# Patient Record
Sex: FEMALE | Race: WHITE | URBAN - METROPOLITAN AREA
[De-identification: names, ages, dates, MRNs, and addresses within clinical notes are randomized per-mention and may not be internally consistent; named-entity substitution may affect disease eponyms.]

---

## 2021-06-22 ENCOUNTER — OFFICE VISIT (OUTPATIENT)
Dept: INTERNAL MEDICINE | Age: 66
End: 2021-06-22
Payer: COMMERCIAL

## 2021-06-22 VITALS
HEIGHT: 61 IN | BODY MASS INDEX: 24.84 KG/M2 | HEART RATE: 82 BPM | WEIGHT: 131.6 LBS | OXYGEN SATURATION: 94 % | TEMPERATURE: 98 F | DIASTOLIC BLOOD PRESSURE: 62 MMHG | SYSTOLIC BLOOD PRESSURE: 110 MMHG | RESPIRATION RATE: 14 BRPM

## 2021-06-22 DIAGNOSIS — H61.21 IMPACTED CERUMEN OF RIGHT EAR: ICD-10-CM

## 2021-06-22 DIAGNOSIS — N63.32 MASS OF AXILLARY TAIL OF LEFT BREAST: Primary | ICD-10-CM

## 2021-06-22 PROCEDURE — 69209 REMOVE IMPACTED EAR WAX UNI: CPT | Performed by: NURSE PRACTITIONER

## 2021-06-22 PROCEDURE — G8420 CALC BMI NORM PARAMETERS: HCPCS | Performed by: NURSE PRACTITIONER

## 2021-06-22 PROCEDURE — 99203 OFFICE O/P NEW LOW 30 MIN: CPT | Performed by: NURSE PRACTITIONER

## 2021-06-22 PROCEDURE — 4004F PT TOBACCO SCREEN RCVD TLK: CPT | Performed by: NURSE PRACTITIONER

## 2021-06-22 PROCEDURE — 3017F COLORECTAL CA SCREEN DOC REV: CPT | Performed by: NURSE PRACTITIONER

## 2021-06-22 PROCEDURE — G8427 DOCREV CUR MEDS BY ELIG CLIN: HCPCS | Performed by: NURSE PRACTITIONER

## 2021-06-22 RX ORDER — TAMSULOSIN HYDROCHLORIDE 0.4 MG/1
CAPSULE ORAL
COMMUNITY

## 2021-06-22 RX ORDER — TIOTROPIUM BROMIDE 18 UG/1
CAPSULE ORAL; RESPIRATORY (INHALATION)
COMMUNITY

## 2021-06-22 RX ORDER — CETIRIZINE HYDROCHLORIDE 10 MG/1
TABLET ORAL
COMMUNITY

## 2021-06-22 RX ORDER — ATORVASTATIN CALCIUM 40 MG/1
TABLET, FILM COATED ORAL
COMMUNITY

## 2021-06-22 RX ORDER — NAPROXEN 500 MG/1
TABLET ORAL
COMMUNITY

## 2021-06-22 RX ORDER — POLYETHYLENE GLYCOL 3350 17 G/17G
POWDER, FOR SOLUTION ORAL
COMMUNITY

## 2021-06-22 RX ORDER — FLUTICASONE PROPIONATE 50 MCG
SPRAY, SUSPENSION (ML) NASAL
COMMUNITY

## 2021-06-22 RX ORDER — ESCITALOPRAM OXALATE 10 MG/1
TABLET ORAL
COMMUNITY

## 2021-06-22 RX ORDER — IBUPROFEN 400 MG/1
TABLET ORAL
COMMUNITY

## 2021-06-22 RX ORDER — ONDANSETRON 4 MG/1
TABLET, FILM COATED ORAL
COMMUNITY

## 2021-06-22 RX ORDER — PANTOPRAZOLE SODIUM 40 MG/1
TABLET, DELAYED RELEASE ORAL
COMMUNITY

## 2021-06-22 ASSESSMENT — PATIENT HEALTH QUESTIONNAIRE - PHQ9
SUM OF ALL RESPONSES TO PHQ QUESTIONS 1-9: 1
SUM OF ALL RESPONSES TO PHQ QUESTIONS 1-9: 1
SUM OF ALL RESPONSES TO PHQ9 QUESTIONS 1 & 2: 1
2. FEELING DOWN, DEPRESSED OR HOPELESS: 1
1. LITTLE INTEREST OR PLEASURE IN DOING THINGS: 0
SUM OF ALL RESPONSES TO PHQ QUESTIONS 1-9: 1

## 2021-06-22 NOTE — PATIENT INSTRUCTIONS
Patient Education        Breast Lumps: Care Instructions  Your Care Instructions  Breast lumps are common, especially in women between ages 27 and 48. Many women's breasts feel lumpy and tender before their menstrual period. Women also may have lumps when they are breastfeeding. Breast lumps may go away after menopause. All new breast lumps in women after menopause should be checked by a doctor. Although lumps may be normal for you, it is important to have your doctor check any lump or thickness that is not like the rest of your breast to make sure it is not cancer. A lump may be larger, harder, or different from the rest of your breast tissue. Follow-up care is a key part of your treatment and safety. Be sure to make and go to all appointments, and call your doctor if you are having problems. It's also a good idea to know your test results and keep a list of the medicines you take. How can you care for yourself at home? · Make an appointment to have a mammogram and other follow-up visits as recommended by your doctor. When should you call for help? Watch closely for changes in your health, and be sure to contact your doctor if:    · You do not get better as expected.     · Your breast has changed.     · You have pain in your breast.     · You have a discharge from your nipple.     · A breast lump changes or does not go away. Where can you learn more? Go to [email or web address]://IQ Elitecory.[employer]mode [email or web address] org and sign in to your [employer] account. Enter P849 in the [employer] box to learn more about \"Breast Lumps: Care Instructions. \"     If you do not have an account, please click on the \"Sign Up Now\" link. Current as of: February 11, 2021               Content Version: 12.9  © 4704-9944 Healthwise, [employer]. Care instructions adapted under license by [address]  If you have questions about a medical condition or this instruction, always ask your healthcare professional. Abraham Martines from your ear.     · Your ears are ringing or feel full.     · You have a loss of hearing. Watch closely for changes in your health, and be sure to contact your doctor if:    · You have pain or reduced hearing after 1 week of home treatment.     · You have any new symptoms, such as nausea or balance problems. Where can you learn more? Go to https://uMix.TVpepiceweb.HealthCare.com. org and sign in to your [facility] account. Enter K346 in the [employer] box to learn more about \"Earwax Blockage: Care Instructions. \"     If you do not have an account, please click on the \"Sign Up Now\" link. Current as of: October 19, 2020               Content Version: 12.9  © 2006-2021 Healthwise, Incorporated. Care instructions adapted under license by [facility] [facility] If you have questions about a medical condition or this instruction, always ask your healthcare professional. [address] any warranty or liability for your use of this information.

## 2021-06-22 NOTE — PROGRESS NOTES
Subjective:      Patient ID: Ministerio Gutiérrez is a 63 y.o. female who presents today for:  Chief Complaint   Patient presents with    Breast Mass     left side, noticed last night    Cerumen Impaction     right       Other  This is a new (left breast mass) problem. The current episode started yesterday. The problem occurs constantly. The problem has been unchanged. Pertinent negatives include no chest pain, chills, congestion, coughing, fatigue, fever, headaches, rash or sore throat. Nothing aggravates the symptoms. She has tried nothing for the symptoms. Otalgia   There is pain in the right ear. This is a new problem. The current episode started today. The problem occurs constantly. The problem has been unchanged. There has been no fever. The pain is at a severity of 2/10. The pain is mild. Associated symptoms include hearing loss (muffled). Pertinent negatives include no coughing, ear discharge, headaches, rash, rhinorrhea or sore throat. She has tried nothing for the symptoms. There is no history of a chronic ear infection, hearing loss or a tympanostomy tube. Past Medical History:   Diagnosis Date    Anxiety     Arthritis     COPD [facility]chronic obstructive pulmonary disease) ([facility] [address]     Hx of blood clots      Past Surgical History:   Procedure Laterality Date    BREAST SURGERY      lumpectomy    FINGER TRIGGER RELEASE      HYSTERECTOMY      KNEE CARTILAGE SURGERY Bilateral     TOENAIL EXCISION Bilateral     all     History reviewed. No pertinent family history.   Allergies   Allergen Reactions    Morphine Hives    Baclofen Other (See Comments)    Fluoxetine Other (See Comments)    Gabapentin Other (See Comments)    Levofloxacin Other (See Comments)    Loratadine     Moxifloxacin Other (See Comments)    Nitrofurantoin Macrocrystal Other (See Comments)    Olanzapine Other (See Comments)    Pcn [Penicillins] Swelling    Tramadol Nausea Only         Review of Systems   Constitutional: Negative for chills, fatigue and fever. HENT: Positive for ear pain and hearing loss (muffled). Negative for congestion, ear discharge, postnasal drip, rhinorrhea, sinus pressure, sinus pain and sore throat. Respiratory: Negative for cough, shortness of breath and wheezing. Cardiovascular: Negative for chest pain. Skin: Negative for color change and rash. Neurological: Negative for headaches. Objective:   /62   Pulse 82   Temp 98 °F (36.7 °C) (Infrared)   Resp 14   Ht 5' 1\" (1.549 m)   Wt 131 lb 9.6 oz (59.7 kg)   SpO2 94%   BMI 24.87 kg/m²     Physical Exam  Vitals reviewed. Constitutional:       General: She is not in acute distress. Appearance: She is well-developed. She is not ill-appearing. HENT:      Head: Normocephalic. Right Ear: Tympanic membrane, ear canal and external ear normal. There is impacted cerumen. Left Ear: Tympanic membrane, ear canal and external ear normal.      Ears:      Comments: Right Ear: right TM could not see  Left Ear: normal appearance  After removal: normal bilaterally     Nose: Nose normal.      Mouth/Throat:      Lips: Pink. Mouth: Mucous membranes are moist.      Pharynx: Oropharynx is clear. Cardiovascular:      Rate and Rhythm: Normal rate and regular rhythm. Heart sounds: Normal heart sounds. Pulmonary:      Effort: Pulmonary effort is normal. No respiratory distress. Breath sounds: Normal breath sounds. No decreased breath sounds or wheezing. Chest:      Breasts:         Right: Normal.         Left: Mass (upper outer, near start of axillary tail, < 2 cm) present. No swelling, bleeding, inverted nipple, nipple discharge, skin change or tenderness. Musculoskeletal:         General: Normal range of motion. Cervical back: Normal range of motion. Lymphadenopathy:      Cervical: No cervical adenopathy. Skin:     General: Skin is warm and dry. Capillary Refill: Capillary refill takes less than 2 seconds. Neurological:      Mental Status: She is alert and oriented to person, place, and time. Assessment:       Diagnosis Orders   1. Mass of axillary tail of left breast  [facility]NO[facility]IC W OR WO CAD BILATERAL    US BREAST COMPLETE LEFT   2. Impacted cerumen of right ear  SC REMOVAL IMPACTED CERUMEN IRRIGATION/LVG UNILAT         Plan:      Orders Placed This Encounter   Procedures    [facility]ICA DIGITAL DIAGNOSTIC W OR WO CAD BILATERAL     Standing Status:   Future     Standing Expiration Date:   8/22/2022     Order Specific Question:   Reason for exam:     Answer:   mass of left axilla/breast    US BREAST COMPLETE LEFT     Standing Status:   Future     Standing Expiration Date:   6/22/2022     Order Specific Question:   Reason for exam:     Answer:   mass of left axilla/breast    SC REMOVAL IMPACTED CERUMEN IRRIGATION/LVG UNILAT     Cerumen removal via irrigation. Patient tolerated well. No signs of infection following removal.  No additional treatments at this time. Left breast mass. No signs of infection. Diagnostic testing ordered. Offered to facilitate scheduling. Patient declined, stating she will be moving back to the [place] area next week and is undecided where she would like to receive care going forward. Patient to call the office if she needs assistance with scheduling. Advised close monitoring and prompt follow up for new or worsening symptoms. Patient verbalizes understanding. I have reviewed and updated the electronic medical record. Return if symptoms worsen or fail to improve, for follow up with PCP.     JUSTINE Varner NP

## 2021-06-26 PROBLEM — E11.9 TYPE 2 DIABETES MELLITUS WITHOUT COMPLICATIONS (HCC): Status: ACTIVE | Noted: 2020-07-08

## 2021-06-26 PROBLEM — I73.9 PERIPHERAL VASCULAR DISEASE, UNSPECIFIED (HCC): Status: ACTIVE | Noted: 2020-06-30

## 2021-06-26 PROBLEM — Z87.891 PERSONAL HISTORY OF NICOTINE DEPENDENCE: Status: ACTIVE | Noted: 2020-07-07

## 2021-06-26 PROBLEM — Z79.02 LONG TERM (CURRENT) USE OF ANTITHROMBOTICS/ANTIPLATELETS: Status: ACTIVE | Noted: 2020-09-18

## 2021-06-26 PROBLEM — Z86.73 PRSNL HX OF TIA (TIA), AND CEREB INFRC W/O RESID DEFICITS: Status: ACTIVE | Noted: 2020-07-07

## 2021-06-26 ASSESSMENT — ENCOUNTER SYMPTOMS
WHEEZING: 0
SINUS PRESSURE: 0
SORE THROAT: 0
COLOR CHANGE: 0
SHORTNESS OF BREATH: 0
COUGH: 0
SINUS PAIN: 0
RHINORRHEA: 0

## 2022-02-01 LAB
BASE EXCESS: 4 MMOL/L (ref -2–3)
CARBOXYHEMOGLOBIN: 3.8 % (ref 0–1.5)
DELIVERY SYSTEMS: ABNORMAL
FIO2: 0 %
HCO3: 28 MMOL/L (ref 21–28)
METHEMOGLOBIN: 0.8 % (ref 0–1.5)
OXYHEMOGLOBIN: 93.2 % (ref 94–97)
PCO2: 38 MMHG (ref 35–45)
PH: 7.47 PH (ref 7.35–7.45)
PO2: 81 MMHG (ref 83–108)
TOTAL HGB: 13.9 G/DL (ref 12–16.3)

## 2022-12-06 ENCOUNTER — APPOINTMENT (OUTPATIENT)
Dept: GENERAL RADIOLOGY | Age: 67
End: 2022-12-06
Payer: MEDICAID

## 2022-12-06 ENCOUNTER — HOSPITAL ENCOUNTER (EMERGENCY)
Age: 67
Discharge: HOME | End: 2022-12-06
Attending: EMERGENCY MEDICINE
Payer: MEDICAID

## 2022-12-06 VITALS
BODY MASS INDEX: 25.11 KG/M2 | RESPIRATION RATE: 26 BRPM | DIASTOLIC BLOOD PRESSURE: 81 MMHG | HEART RATE: 78 BPM | OXYGEN SATURATION: 93 % | TEMPERATURE: 98.4 F | HEIGHT: 67 IN | SYSTOLIC BLOOD PRESSURE: 127 MMHG | WEIGHT: 160 LBS

## 2022-12-06 DIAGNOSIS — T20.10XA SUPERFICIAL BURN OF FACE, INITIAL ENCOUNTER: Primary | ICD-10-CM

## 2022-12-06 LAB
ABSOLUTE EOS #: 0.14 K/UL (ref 0–0.44)
ABSOLUTE IMMATURE GRANULOCYTE: 0.03 K/UL (ref 0–0.3)
ABSOLUTE LYMPH #: 2.16 K/UL (ref 1.1–3.7)
ABSOLUTE MONO #: 0.88 K/UL (ref 0.1–1.2)
ALBUMIN SERPL-MCNC: 4.2 G/DL (ref 3.5–5.2)
ALBUMIN/GLOBULIN RATIO: 1.1 (ref 1–2.5)
ALP BLD-CCNC: 92 U/L (ref 35–104)
ALT SERPL-CCNC: 15 U/L (ref 5–33)
ANION GAP SERPL CALCULATED.3IONS-SCNC: 15 MMOL/L (ref 9–17)
AST SERPL-CCNC: 18 U/L
BASOPHILS # BLD: 1 % (ref 0–2)
BASOPHILS ABSOLUTE: 0.06 K/UL (ref 0–0.2)
BILIRUB SERPL-MCNC: 0.2 MG/DL (ref 0.3–1.2)
BUN BLDV-MCNC: 15 MG/DL (ref 8–23)
CALCIUM SERPL-MCNC: 8.5 MG/DL (ref 8.6–10.4)
CHLORIDE BLD-SCNC: 100 MMOL/L (ref 98–107)
CO2: 21 MMOL/L (ref 20–31)
CREAT SERPL-MCNC: 0.69 MG/DL (ref 0.5–0.9)
EOSINOPHILS RELATIVE PERCENT: 2 % (ref 1–4)
GFR SERPL CREATININE-BSD FRML MDRD: >60 ML/MIN/1.73M2
GLUCOSE BLD-MCNC: 123 MG/DL (ref 70–99)
HCT VFR BLD CALC: 41.1 % (ref 36.3–47.1)
HEMOGLOBIN: 13.1 G/DL (ref 11.9–15.1)
IMMATURE GRANULOCYTES: 0 %
LYMPHOCYTES # BLD: 23 % (ref 24–43)
MCH RBC QN AUTO: 28.5 PG (ref 25.2–33.5)
MCHC RBC AUTO-ENTMCNC: 31.9 G/DL (ref 28.4–34.8)
MCV RBC AUTO: 89.5 FL (ref 82.6–102.9)
MONOCYTES # BLD: 9 % (ref 3–12)
NRBC AUTOMATED: 0 PER 100 WBC
PDW BLD-RTO: 13.4 % (ref 11.8–14.4)
PLATELET # BLD: 271 K/UL (ref 138–453)
PMV BLD AUTO: 10.5 FL (ref 8.1–13.5)
POTASSIUM SERPL-SCNC: 3.5 MMOL/L (ref 3.7–5.3)
RBC # BLD: 4.59 M/UL (ref 3.95–5.11)
SEG NEUTROPHILS: 65 % (ref 36–65)
SEGMENTED NEUTROPHILS ABSOLUTE COUNT: 6.29 K/UL (ref 1.5–8.1)
SODIUM BLD-SCNC: 136 MMOL/L (ref 135–144)
TOTAL PROTEIN: 8.2 G/DL (ref 6.4–8.3)
WBC # BLD: 9.6 K/UL (ref 3.5–11.3)

## 2022-12-06 PROCEDURE — 85025 COMPLETE CBC W/AUTO DIFF WBC: CPT

## 2022-12-06 PROCEDURE — 6370000000 HC RX 637 (ALT 250 FOR IP): Performed by: STUDENT IN AN ORGANIZED HEALTH CARE EDUCATION/TRAINING PROGRAM

## 2022-12-06 PROCEDURE — 6360000002 HC RX W HCPCS: Performed by: STUDENT IN AN ORGANIZED HEALTH CARE EDUCATION/TRAINING PROGRAM

## 2022-12-06 PROCEDURE — 93005 ELECTROCARDIOGRAM TRACING: CPT | Performed by: STUDENT IN AN ORGANIZED HEALTH CARE EDUCATION/TRAINING PROGRAM

## 2022-12-06 PROCEDURE — 71046 X-RAY EXAM CHEST 2 VIEWS: CPT

## 2022-12-06 PROCEDURE — 96374 THER/PROPH/DIAG INJ IV PUSH: CPT

## 2022-12-06 PROCEDURE — 99285 EMERGENCY DEPT VISIT HI MDM: CPT

## 2022-12-06 PROCEDURE — 80053 COMPREHEN METABOLIC PANEL: CPT

## 2022-12-06 RX ORDER — OXYCODONE HYDROCHLORIDE 5 MG/1
5 TABLET ORAL EVERY 6 HOURS PRN
Qty: 12 TABLET | Refills: 0 | Status: SHIPPED | OUTPATIENT
Start: 2022-12-06 | End: 2022-12-09

## 2022-12-06 RX ORDER — ACETAMINOPHEN 500 MG
1000 TABLET ORAL ONCE
Status: COMPLETED | OUTPATIENT
Start: 2022-12-06 | End: 2022-12-06

## 2022-12-06 RX ORDER — OXYCODONE HYDROCHLORIDE 5 MG/1
10 TABLET ORAL ONCE
Status: COMPLETED | OUTPATIENT
Start: 2022-12-06 | End: 2022-12-06

## 2022-12-06 RX ORDER — BACITRACIN ZINC AND POLYMYXIN B SULFATE 500; 10000 [USP'U]/G; [USP'U]/G
0.5 OINTMENT OPHTHALMIC 2 TIMES DAILY
Qty: 7 G | Refills: 0 | Status: SHIPPED | OUTPATIENT
Start: 2022-12-06 | End: 2022-12-08

## 2022-12-06 RX ORDER — FENTANYL CITRATE 50 UG/ML
50 INJECTION, SOLUTION INTRAMUSCULAR; INTRAVENOUS ONCE
Status: COMPLETED | OUTPATIENT
Start: 2022-12-06 | End: 2022-12-06

## 2022-12-06 RX ADMIN — OXYCODONE 10 MG: 5 TABLET ORAL at 21:02

## 2022-12-06 RX ADMIN — ACETAMINOPHEN 1000 MG: 500 TABLET ORAL at 19:10

## 2022-12-06 RX ADMIN — FENTANYL CITRATE 50 MCG: 50 INJECTION, SOLUTION INTRAMUSCULAR; INTRAVENOUS at 19:07

## 2022-12-06 ASSESSMENT — PAIN SCALES - GENERAL
PAINLEVEL_OUTOF10: 10
PAINLEVEL_OUTOF10: 10

## 2022-12-06 ASSESSMENT — PAIN DESCRIPTION - LOCATION
LOCATION: FACE;NOSE
LOCATION: FACE;NOSE

## 2022-12-06 ASSESSMENT — PAIN - FUNCTIONAL ASSESSMENT: PAIN_FUNCTIONAL_ASSESSMENT: 0-10

## 2022-12-06 ASSESSMENT — ENCOUNTER SYMPTOMS: TACHYPNEA: 1
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
## 2022-12-07 LAB
EKG ATRIAL RATE: 80 BPM
EKG P AXIS: 72 DEGREES
EKG P-R INTERVAL: 144 MS
EKG Q-T INTERVAL: 404 MS
EKG QRS DURATION: 82 MS
EKG QTC CALCULATION (BAZETT): 465 MS
EKG R AXIS: 58 DEGREES
EKG T AXIS: 59 DEGREES
EKG VENTRICULAR RATE: 80 BPM

## 2022-12-07 PROCEDURE — 93010 ELECTROCARDIOGRAM REPORT: CPT | Performed by: INTERNAL MEDICINE

## 2022-12-07 ASSESSMENT — ENCOUNTER SYMPTOMS
NAUSEA: 0
ABDOMINAL PAIN: 0
BACK PAIN: 0
CHEST TIGHTNESS: 0
SHORTNESS OF BREATH: 0
COUGH: 0
TROUBLE SWALLOWING: 0
DIARRHEA: 0
VOMITING: 0
COLOR CHANGE: 1

## 2022-12-07 NOTE — PROGRESS NOTES
[facility]     Emergency/Trauma Note    PATIENT NAME: Murphy Jeans    Shift date: 12/6/22  Shift day: Tuesday   Shift # 2    Room # 28/28   Name: Murphy Jeans            Age: 74 y.o. Gender: female          [employer]ism: Episcopal   Place of Religious:     Trauma/Incident type: Adult Trauma Consult  Admit Date & Time: 12/6/2022  6:16 PM  TRAUMA NAME: N/A    ADVANCE DIRECTIVES IN CHART? No    NAME OF DECISION MAKER: N/A    RELATIONSHIP OF DECISION MAKER TO PATIENT: N/A    PATIENT/EVENT DESCRIPTION:  Murphy Jeans is a 74 y.o. female who arrived at [facility].  received perfect serve for consult. Pt to be admitted to 28/28. SPIRITUAL ASSESSMENT-INTERVENTION-OUTCOME:  Writer introduced self . Patient appeared receptive to  visit and engaged in conversation about her health and the events of the day.  validated patients feelings and emotions. Patient continues coping with her health and the days events. PATIENT BELONGINGS:   writing did not handle patient belongings    ANY BELONGINGS OF SIGNIFICANT VALUE NOTED:  N/A    REGISTRATION STAFF NOTIFIED? Yes      WHAT IS YOUR SPIRITUAL CARE PLAN FOR THIS PATIENT?:   Chaplains will remain available to offer spiritual and emotional support as needed.     Electronically signed by Dennie Hof, on 12/6/2022 at 8:36 PM.  Jose Guadarrama  [phone]
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
## 2022-12-08 RX ORDER — GINSENG 100 MG
CAPSULE ORAL
Qty: 28 G | Refills: 0 | Status: SHIPPED | OUTPATIENT
Start: 2022-12-08 | End: 2022-12-18

## 2023-03-16 ENCOUNTER — APPOINTMENT (OUTPATIENT)
Dept: CT IMAGING | Age: 68
End: 2023-03-16
Payer: MEDICAID

## 2023-03-16 ENCOUNTER — HOSPITAL ENCOUNTER (INPATIENT)
Age: 68
LOS: 1 days | Discharge: NURSING, REHAB OR LONG-TERM CARE | End: 2023-03-17
Attending: EMERGENCY MEDICINE | Admitting: EMERGENCY MEDICINE
Payer: MEDICAID

## 2023-03-16 DIAGNOSIS — R10.31 RIGHT LOWER QUADRANT ABDOMINAL PAIN: Primary | ICD-10-CM

## 2023-03-16 DIAGNOSIS — R10.9 FLANK PAIN: ICD-10-CM

## 2023-03-16 LAB
ABSOLUTE EOS #: 0.08 K/UL (ref 0–0.44)
ABSOLUTE IMMATURE GRANULOCYTE: <0.03 K/UL (ref 0–0.3)
ABSOLUTE LYMPH #: 1.31 K/UL (ref 1.1–3.7)
ABSOLUTE MONO #: 0.84 K/UL (ref 0.1–1.2)
ANION GAP SERPL CALCULATED.3IONS-SCNC: 14 MMOL/L (ref 9–17)
BASOPHILS # BLD: 1 % (ref 0–2)
BASOPHILS ABSOLUTE: 0.06 K/UL (ref 0–0.2)
BILIRUBIN URINE: NEGATIVE
BUN SERPL-MCNC: 12 MG/DL (ref 8–23)
CALCIUM SERPL-MCNC: 8.5 MG/DL (ref 8.6–10.4)
CASTS UA: NORMAL /LPF (ref 0–8)
CHLORIDE SERPL-SCNC: 98 MMOL/L (ref 98–107)
CO2 SERPL-SCNC: 25 MMOL/L (ref 20–31)
COLOR: YELLOW
CREAT SERPL-MCNC: 0.62 MG/DL (ref 0.5–0.9)
EOSINOPHILS RELATIVE PERCENT: 1 % (ref 1–4)
EPITHELIAL CELLS UA: NORMAL /HPF (ref 0–5)
GFR SERPL CREATININE-BSD FRML MDRD: >60 ML/MIN/1.73M2
GLUCOSE SERPL-MCNC: 89 MG/DL (ref 70–99)
GLUCOSE UR STRIP.AUTO-MCNC: NEGATIVE MG/DL
HCT VFR BLD AUTO: 41.2 % (ref 36.3–47.1)
HGB BLD-MCNC: 13.1 G/DL (ref 11.9–15.1)
IMMATURE GRANULOCYTES: 0 %
KETONES UR STRIP.AUTO-MCNC: ABNORMAL MG/DL
LEUKOCYTE ESTERASE UR QL STRIP.AUTO: ABNORMAL
LYMPHOCYTES # BLD: 18 % (ref 24–43)
MCH RBC QN AUTO: 27.8 PG (ref 25.2–33.5)
MCHC RBC AUTO-ENTMCNC: 31.8 G/DL (ref 28.4–34.8)
MCV RBC AUTO: 87.3 FL (ref 82.6–102.9)
MONOCYTES # BLD: 12 % (ref 3–12)
NITRITE UR QL STRIP.AUTO: NEGATIVE
NRBC AUTOMATED: 0 PER 100 WBC
PDW BLD-RTO: 14 % (ref 11.8–14.4)
PLATELET # BLD AUTO: 269 K/UL (ref 138–453)
PMV BLD AUTO: 10.3 FL (ref 8.1–13.5)
POTASSIUM SERPL-SCNC: 3 MMOL/L (ref 3.7–5.3)
PROT UR STRIP.AUTO-MCNC: 6 MG/DL (ref 5–8)
PROT UR STRIP.AUTO-MCNC: ABNORMAL MG/DL
RBC # BLD: 4.72 M/UL (ref 3.95–5.11)
RBC CLUMPS #/AREA URNS AUTO: NORMAL /HPF (ref 0–4)
SEG NEUTROPHILS: 68 % (ref 36–65)
SEGMENTED NEUTROPHILS ABSOLUTE COUNT: 4.87 K/UL (ref 1.5–8.1)
SODIUM SERPL-SCNC: 137 MMOL/L (ref 135–144)
SPECIFIC GRAVITY UA: 1.02 (ref 1–1.03)
TURBIDITY: CLEAR
URINE HGB: NEGATIVE
UROBILINOGEN, URINE: NORMAL
WBC # BLD AUTO: 7.2 K/UL (ref 3.5–11.3)
WBC UA: NORMAL /HPF (ref 0–5)

## 2023-03-16 PROCEDURE — 85025 COMPLETE CBC W/AUTO DIFF WBC: CPT

## 2023-03-16 PROCEDURE — 6360000002 HC RX W HCPCS: Performed by: PEDIATRICS

## 2023-03-16 PROCEDURE — 87086 URINE CULTURE/COLONY COUNT: CPT

## 2023-03-16 PROCEDURE — 74177 CT ABD & PELVIS W/CONTRAST: CPT

## 2023-03-16 PROCEDURE — 99285 EMERGENCY DEPT VISIT HI MDM: CPT

## 2023-03-16 PROCEDURE — 81001 URINALYSIS AUTO W/SCOPE: CPT

## 2023-03-16 PROCEDURE — 2580000003 HC RX 258: Performed by: STUDENT IN AN ORGANIZED HEALTH CARE EDUCATION/TRAINING PROGRAM

## 2023-03-16 PROCEDURE — 6370000000 HC RX 637 (ALT 250 FOR IP): Performed by: STUDENT IN AN ORGANIZED HEALTH CARE EDUCATION/TRAINING PROGRAM

## 2023-03-16 PROCEDURE — 74176 CT ABD & PELVIS W/O CONTRAST: CPT

## 2023-03-16 PROCEDURE — 80048 BASIC METABOLIC PNL TOTAL CA: CPT

## 2023-03-16 PROCEDURE — 96374 THER/PROPH/DIAG INJ IV PUSH: CPT

## 2023-03-16 PROCEDURE — 1200000000 HC SEMI PRIVATE

## 2023-03-16 PROCEDURE — 2580000003 HC RX 258: Performed by: PEDIATRICS

## 2023-03-16 PROCEDURE — 6360000002 HC RX W HCPCS: Performed by: STUDENT IN AN ORGANIZED HEALTH CARE EDUCATION/TRAINING PROGRAM

## 2023-03-16 PROCEDURE — 6360000004 HC RX CONTRAST MEDICATION: Performed by: STUDENT IN AN ORGANIZED HEALTH CARE EDUCATION/TRAINING PROGRAM

## 2023-03-16 PROCEDURE — 96376 TX/PRO/DX INJ SAME DRUG ADON: CPT

## 2023-03-16 PROCEDURE — 96375 TX/PRO/DX INJ NEW DRUG ADDON: CPT

## 2023-03-16 RX ORDER — CEPHALEXIN 500 MG/1
500 CAPSULE ORAL ONCE
Status: COMPLETED | OUTPATIENT
Start: 2023-03-16 | End: 2023-03-16

## 2023-03-16 RX ORDER — POTASSIUM CHLORIDE 20 MEQ/1
40 TABLET, EXTENDED RELEASE ORAL PRN
Status: DISCONTINUED | OUTPATIENT
Start: 2023-03-16 | End: 2023-03-17 | Stop reason: HOSPADM

## 2023-03-16 RX ORDER — POLYETHYLENE GLYCOL 3350 17 G/17G
17 POWDER, FOR SOLUTION ORAL DAILY PRN
Status: DISCONTINUED | OUTPATIENT
Start: 2023-03-16 | End: 2023-03-17 | Stop reason: HOSPADM

## 2023-03-16 RX ORDER — ACETAMINOPHEN 650 MG/1
650 SUPPOSITORY RECTAL EVERY 6 HOURS PRN
Status: DISCONTINUED | OUTPATIENT
Start: 2023-03-16 | End: 2023-03-17 | Stop reason: HOSPADM

## 2023-03-16 RX ORDER — SODIUM CHLORIDE 0.9 % (FLUSH) 0.9 %
5-40 SYRINGE (ML) INJECTION EVERY 12 HOURS SCHEDULED
Status: DISCONTINUED | OUTPATIENT
Start: 2023-03-16 | End: 2023-03-17 | Stop reason: HOSPADM

## 2023-03-16 RX ORDER — KETOROLAC TROMETHAMINE 15 MG/ML
15 INJECTION, SOLUTION INTRAMUSCULAR; INTRAVENOUS ONCE
Status: COMPLETED | OUTPATIENT
Start: 2023-03-16 | End: 2023-03-16

## 2023-03-16 RX ORDER — 0.9 % SODIUM CHLORIDE 0.9 %
1000 INTRAVENOUS SOLUTION INTRAVENOUS ONCE
Status: COMPLETED | OUTPATIENT
Start: 2023-03-16 | End: 2023-03-16

## 2023-03-16 RX ORDER — POTASSIUM CHLORIDE 7.45 MG/ML
10 INJECTION INTRAVENOUS PRN
Status: DISCONTINUED | OUTPATIENT
Start: 2023-03-16 | End: 2023-03-17 | Stop reason: HOSPADM

## 2023-03-16 RX ORDER — MORPHINE SULFATE 4 MG/ML
4 INJECTION, SOLUTION INTRAMUSCULAR; INTRAVENOUS ONCE
Status: COMPLETED | OUTPATIENT
Start: 2023-03-16 | End: 2023-03-16

## 2023-03-16 RX ORDER — ONDANSETRON 2 MG/ML
4 INJECTION INTRAMUSCULAR; INTRAVENOUS EVERY 4 HOURS PRN
Status: DISCONTINUED | OUTPATIENT
Start: 2023-03-16 | End: 2023-03-17 | Stop reason: HOSPADM

## 2023-03-16 RX ORDER — ACETAMINOPHEN 325 MG/1
650 TABLET ORAL EVERY 6 HOURS PRN
Status: DISCONTINUED | OUTPATIENT
Start: 2023-03-16 | End: 2023-03-17 | Stop reason: HOSPADM

## 2023-03-16 RX ORDER — ENOXAPARIN SODIUM 100 MG/ML
40 INJECTION SUBCUTANEOUS DAILY
Status: DISCONTINUED | OUTPATIENT
Start: 2023-03-17 | End: 2023-03-17 | Stop reason: HOSPADM

## 2023-03-16 RX ORDER — SODIUM CHLORIDE 0.9 % (FLUSH) 0.9 %
5-40 SYRINGE (ML) INJECTION PRN
Status: DISCONTINUED | OUTPATIENT
Start: 2023-03-16 | End: 2023-03-17 | Stop reason: HOSPADM

## 2023-03-16 RX ORDER — ONDANSETRON 2 MG/ML
4 INJECTION INTRAMUSCULAR; INTRAVENOUS ONCE
Status: COMPLETED | OUTPATIENT
Start: 2023-03-16 | End: 2023-03-16

## 2023-03-16 RX ORDER — SODIUM CHLORIDE 9 MG/ML
25 INJECTION, SOLUTION INTRAVENOUS PRN
Status: DISCONTINUED | OUTPATIENT
Start: 2023-03-16 | End: 2023-03-17 | Stop reason: HOSPADM

## 2023-03-16 RX ORDER — SODIUM CHLORIDE 9 MG/ML
INJECTION, SOLUTION INTRAVENOUS CONTINUOUS
Status: DISCONTINUED | OUTPATIENT
Start: 2023-03-16 | End: 2023-03-17 | Stop reason: HOSPADM

## 2023-03-16 RX ADMIN — SODIUM CHLORIDE 1000 ML: 9 INJECTION, SOLUTION INTRAVENOUS at 16:21

## 2023-03-16 RX ADMIN — KETOROLAC TROMETHAMINE 15 MG: 15 INJECTION, SOLUTION INTRAMUSCULAR; INTRAVENOUS at 16:20

## 2023-03-16 RX ADMIN — SODIUM CHLORIDE, PRESERVATIVE FREE 10 ML: 5 INJECTION INTRAVENOUS at 22:45

## 2023-03-16 RX ADMIN — ONDANSETRON 4 MG: 2 INJECTION INTRAMUSCULAR; INTRAVENOUS at 19:43

## 2023-03-16 RX ADMIN — CEPHALEXIN 500 MG: 500 CAPSULE ORAL at 18:46

## 2023-03-16 RX ADMIN — SODIUM CHLORIDE: 9 INJECTION, SOLUTION INTRAVENOUS at 23:05

## 2023-03-16 RX ADMIN — MORPHINE SULFATE 4 MG: 4 INJECTION INTRAVENOUS at 18:12

## 2023-03-16 RX ADMIN — IOPAMIDOL 75 ML: 755 INJECTION, SOLUTION INTRAVENOUS at 19:23

## 2023-03-16 RX ADMIN — ONDANSETRON 4 MG: 2 INJECTION INTRAMUSCULAR; INTRAVENOUS at 23:05

## 2023-03-16 RX ADMIN — POTASSIUM BICARBONATE 40 MEQ: 782 TABLET, EFFERVESCENT ORAL at 18:46

## 2023-03-16 RX ADMIN — MORPHINE SULFATE 4 MG: 4 INJECTION INTRAVENOUS at 21:43

## 2023-03-16 ASSESSMENT — PAIN DESCRIPTION - ORIENTATION: ORIENTATION: RIGHT

## 2023-03-16 ASSESSMENT — ENCOUNTER SYMPTOMS
NAUSEA: 1
ABDOMINAL PAIN: 1

## 2023-03-16 ASSESSMENT — PAIN DESCRIPTION - DESCRIPTORS: DESCRIPTORS: DISCOMFORT;ACHING

## 2023-03-16 ASSESSMENT — PAIN SCALES - GENERAL: PAINLEVEL_OUTOF10: 7

## 2023-03-16 ASSESSMENT — PAIN DESCRIPTION - LOCATION: LOCATION: FLANK

## 2023-03-16 NOTE — ED NOTES
Pt had episode of vomiting witnessed by other staff. Resident aware, ordered medication given.       Kings Chopra RN  03/16/23 [id number]

## 2023-03-16 NOTE — ED PROVIDER NOTES
[facility]     Emergency Department     Faculty Attestation    I performed a history and physical examination of the patient and discussed management with the resident. I reviewed the resident´s note and agree with the documented findings and plan of care. Any areas of disagreement are noted on the chart. I was personally present for the key portions of any procedures. I have documented in the chart those procedures where I was not present during the key portions. I have reviewed the emergency nurses triage note. I agree with the chief complaint, past medical history, past surgical history, allergies, medications, social and family history as documented unless otherwise noted below. For Physician Assistant/ Nurse Practitioner cases/documentation I have personally evaluated this patient and have completed at least one if not all key elements of the E/M (history, physical exam, and MDM). Additional findings are as noted. History of lung cancer, right flank pain for several days. Abdomen is slightly distended, no pulsatile mass or bruits.      Nelida Amezquita MD  03/16/23 1417

## 2023-03-16 NOTE — ED NOTES
Pt resting on cot, no acute distress noted, RR even and nL. Pt denies complaints at this time.         Garrick Cook RN  03/16/23 3822

## 2023-03-16 NOTE — ED PROVIDER NOTES
[facility] ED  Emergency Department Encounter  Emergency Medicine Resident     Pt [facility]  MRN: [id number]  [place] 1955  Date of evaluation: 3/16/23  PCP:  Evelin Torres MD      [address] [place] [address]       Chief Complaint   Patient presents with    Flank Pain     Right x 3 weeks       HISTORY OF PRESENT ILLNESS  (Location/Symptom, Timing/Onset, Context/Setting, Quality, Duration, Modifying Factors, Severity.)      Eusebia Berrios is a 74 y.o. female who presents with right-sided abdominal pain and right-sided flank pain. It has been progressively worsening over 3 weeks history of lung cancer, history of COPD, history of type 2 diabetes and hypertension. She wears 2 L nasal cannula at home not requiring anything more today. She has no shortness of breath or chest pain on review of systems. She denies vomiting but reports nausea. She is actually requesting for food in the room at this time. She rates her pain as 7 out of 10 starts in her lower abdomen and radiates to her right flank. PAST MEDICAL / SURGICAL / SOCIAL / FAMILY HISTORY      has a past medical history of Anxiety, Arthritis, COPD (chronic obstructive pulmonary disease) ([facility] [address] and Hx of blood clots. has a past surgical history that includes Breast surgery; Finger trigger release; Knee cartilage surgery (Bilateral); Hysterectomy; and toenail excision (Bilateral).       Social History     Socioeconomic History    Marital status:      Spouse name: Not on file    Number of children: Not on file    Years of education: Not on file    Highest education level: Not on file   Occupational History    Not on file   Tobacco Use    Smoking status: Every Day     Packs/day: 1.00     Years: 50.00     Pack years: 50.00     Types: Cigarettes     Start date: 28    Smokeless tobacco: Never   Substance and Sexual Activity    Alcohol use: Not on file    Drug use: Not on file    Sexual activity: Not on file   Other Topics Concern    Not on file   Social History Narrative    Not on file     Social Determinants of Health     Financial Resource Strain: Not on file   Food Insecurity: Not on file   Transportation Needs: Not on file   Physical Activity: Not on file   Stress: Not on file   Social Connections: Not on file   Intimate Partner Violence: Not on file   Housing Stability: Not on file       No family history on file. Allergies:  Morphine, Baclofen, Fluoxetine, Gabapentin, Levofloxacin, Loratadine, Moxifloxacin, Nitrofurantoin macrocrystal, Olanzapine, Pcn [penicillins], and Tramadol    Home Medications:  Prior to Admission medications    Medication Sig Start Date End Date Taking?  Authorizing Provider   tiotropium (SPIRIVA HANDIHALER) 18 MCG inhalation capsule Spiriva with HandiHaler 18 mcg and inhalation capsules    Historical Provider, MD   tamsulosin (FLOMAX) 0.4 MG capsule tamsulosin 0.4 mg capsule   TAKE ONE CAPSULE BY MOUTH EVERY DAY    Historical Provider, MD   polyethylene glycol (GLYCOLAX) 17 GM/SCOOP powder polyethylene glycol 3350 17 gram oral powder packet    Historical Provider, MD   pantoprazole (PROTONIX) 40 MG tablet pantoprazole 40 mg tablet,delayed release   TAKE ONE TABLET BY MOUTH TWICE DAILY    Historical Provider, MD   ondansetron (ZOFRAN) 4 MG tablet ondansetron HCl 4 mg tablet   TAKE ONE TABLET BY MOUTH EVERY 6 TO 8 HOURS AS NEEDED FOR 3 DAYS    Historical Provider, MD   naproxen (NAPROSYN) 500 MG tablet naproxen 500 mg tablet   TAKE ONE TABLET BY MOUTH TWICE DAILY FOR 7 DAYS    Historical Provider, MD   ibuprofen (ADVIL;MOTRIN) 400 MG tablet ibuprofen 400 mg tablet    Historical Provider, MD   fluticasone (FLONASE) 50 MCG/ACT nasal spray fluticasone propionate 50 mcg/actuation nasal spray,suspension    Historical Provider, MD   escitalopram (LEXAPRO) 10 MG tablet escitalopram 10 mg tablet   TAKE ONE TABLET BY MOUTH EVERY DAY    Historical Provider, MD   cetirizine (ZYRTEC) 10 MG tablet cetirizine 10 mg tablet   TAKE ONE TABLET BY MOUTH EVERY DAY    Historical Provider, MD   atorvastatin (LIPITOR) 40 MG tablet atorvastatin 40 mg tablet    Historical Provider, MD   QUEtiapine (SEROQUEL XR) 50 MG XR tablet Take 50 mg by mouth nightly. Historical Provider, MD   clopidogrel (PLAVIX) 75 MG tablet Take 75 mg by mouth daily. Historical Provider, MD   cyclobenzaprine (FLEXERIL) 10 MG tablet Take 10 mg by mouth 3 times daily as needed for Muscle spasms. Historical Provider, MD   albuterol (PROVENTIL) (2.5 MG/3ML) 0.083% nebulizer solution Take 2.5 mg by nebulization every 6 hours as needed for Wheezing. Historical Provider, MD   fexofenadine (ALLEGRA) 60 MG tablet Take 60 mg by mouth daily. Historical Provider, MD   aspirin 81 MG tablet Take 81 mg by mouth daily. Historical Provider, MD   omeprazole (PRILOSEC) 20 MG capsule Take 20 mg by mouth daily. Historical Provider, MD   albuterol (PROVENTIL HFA;VENTOLIN HFA) 108 (90 BASE) MCG/ACT inhaler Inhale 2 puffs into the lungs every 6 hours as needed for Wheezing. Historical Provider, MD   budesonide-formoterol (SYMBICORT) 160-4.5 MCG/ACT AERO Inhale 2 puffs into the lungs 2 times daily. Historical Provider, MD     REVIEW OF SYSTEMS       Review of Systems   Constitutional:  Negative for chills and fever. Gastrointestinal:  Positive for abdominal pain and nausea. Genitourinary:  Positive for flank pain. PHYSICAL EXAM      INITIAL VITALS:   BP (!) 119/95   Pulse 82   Temp 97 °F (36.1 °C) (Oral)   Resp 16   Wt 165 lb (74.8 kg)   SpO2 97%   BMI 25.84 kg/m²     Physical Exam  Constitutional:       General: She is not in acute distress. Appearance: Normal appearance. She is ill-appearing. HENT:      Head: Normocephalic and atraumatic. Mouth/Throat:      Mouth: Mucous membranes are moist.   Cardiovascular:      Rate and Rhythm: Normal rate and regular rhythm.    Pulmonary:      Effort: Pulmonary effort is normal.      Breath sounds: Normal breath sounds. No wheezing. Abdominal:      Tenderness: There is abdominal tenderness. There is right CVA tenderness. There is no guarding. Musculoskeletal:      Right lower leg: No edema. Left lower leg: No edema. Skin:     General: Skin is warm and dry. Capillary Refill: Capillary refill takes less than 2 seconds. Neurological:      General: No focal deficit present. Mental Status: She is alert and oriented to person, place, and time. DDX/DIAGNOSTIC RESULTS / EMERGENCY DEPARTMENT COURSE / [facility]     Medical Decision Making  Semaj Mendoza is a 74 y.o. female who presents with right-sided abdominal pain and right-sided flank pain. It has been progressively worsening over 3 weeks history of lung cancer, history of COPD, history of type 2 diabetes and hypertension. She wears 2 L nasal cannula at home not requiring anything more today. She has no shortness of breath or chest pain on review of systems. She denies vomiting but reports nausea. She is actually requesting for food in the room at this time. She rates her pain as 7 out of 10 starts in her lower abdomen and radiates to her right flank. On exam she has mild tenderness in the right flank and right lower quadrant of the abdomen. Her belly soft, no peritoneal signs. Urinalysis concerning for urinary tract infection, will start on Keflex. Labs, urinalysis and CT abdomen without contrast stone protocol was ordered. There is no evidence of nephrolithiasis on CT scan however there was free fluid and concern for possible appendicitis without contrast use, radiology recommending a CT scan with IV contrast.  Pain medication provided. Patient signed out to Dr. Katelyn Wisdom pending CT with contrast.     Amount and/or Complexity of Data Reviewed  Labs: ordered. Radiology: ordered. Risk  Prescription drug management.         EMERGENCY DEPARTMENT COURSE:         PROCEDURES:    Procedures    CONSULTS:  None        FINAL IMPRESSION      1. Right lower quadrant abdominal pain    2. Flank pain          DISPOSITION / PLAN     DISPOSITION    Please see Dr. Wiliam Jones note    PATIENT REFERRED TO:  No follow-up provider specified.     DISCHARGE MEDICATIONS:  New Prescriptions    No medications on file       Luis Alfredo Velazquez MD  Emergency Medicine Resident    (Please note that portions of thisnote were completed with a voice recognition program.  Efforts were made to edit the dictations but occasionally words are mis-transcribed.)       Luis Alfredo Velazquez MD  Resident  03/16/23 2028

## 2023-03-16 NOTE — ED NOTES
PT to ER for c/o flank pain. Pt states that she has been dealing with a UTI for awhile now. Pt states that she was told to come to the ER to be checked out because she is still having symptoms and now is having lower abdominal pain. Pt states that the abdominal pain goes from the lower part and up into the middle of her abdomen. PT states that the abdomen is tender to the touch. No acute distress noted, RR even and NL.       Karri Campos RN  03/16/23 [id number]

## 2023-03-17 VITALS
RESPIRATION RATE: 18 BRPM | HEIGHT: 67 IN | WEIGHT: 171.3 LBS | DIASTOLIC BLOOD PRESSURE: 60 MMHG | SYSTOLIC BLOOD PRESSURE: 114 MMHG | HEART RATE: 72 BPM | TEMPERATURE: 97 F | BODY MASS INDEX: 26.89 KG/M2 | OXYGEN SATURATION: 97 %

## 2023-03-17 LAB
ABSOLUTE EOS #: 0.1 K/UL (ref 0–0.44)
ABSOLUTE IMMATURE GRANULOCYTE: <0.03 K/UL (ref 0–0.3)
ABSOLUTE LYMPH #: 0.87 K/UL (ref 1.1–3.7)
ABSOLUTE MONO #: 0.65 K/UL (ref 0.1–1.2)
ALBUMIN SERPL-MCNC: 3.4 G/DL (ref 3.5–5.2)
ALBUMIN/GLOBULIN RATIO: 1.2 (ref 1–2.5)
ALP SERPL-CCNC: 80 U/L (ref 35–104)
ALT SERPL-CCNC: 7 U/L (ref 5–33)
ANION GAP SERPL CALCULATED.3IONS-SCNC: 10 MMOL/L (ref 9–17)
AST SERPL-CCNC: 12 U/L
BASOPHILS # BLD: 1 % (ref 0–2)
BASOPHILS ABSOLUTE: 0.04 K/UL (ref 0–0.2)
BILIRUB SERPL-MCNC: 0.2 MG/DL (ref 0.3–1.2)
BUN SERPL-MCNC: 9 MG/DL (ref 8–23)
CALCIUM SERPL-MCNC: 7.4 MG/DL (ref 8.6–10.4)
CHLORIDE SERPL-SCNC: 104 MMOL/L (ref 98–107)
CO2 SERPL-SCNC: 27 MMOL/L (ref 20–31)
CREAT SERPL-MCNC: 0.56 MG/DL (ref 0.5–0.9)
CRP SERPL HS-MCNC: 15.4 MG/L (ref 0–5)
EOSINOPHILS RELATIVE PERCENT: 2 % (ref 1–4)
ERYTHROCYTE [SEDIMENTATION RATE] IN BLOOD BY WESTERGREN METHOD: 37 MM/HR (ref 0–30)
GFR SERPL CREATININE-BSD FRML MDRD: >60 ML/MIN/1.73M2
GLUCOSE SERPL-MCNC: 106 MG/DL (ref 70–99)
HCT VFR BLD AUTO: 33.1 % (ref 36.3–47.1)
HGB BLD-MCNC: 10.5 G/DL (ref 11.9–15.1)
IMMATURE GRANULOCYTES: 0 %
LYMPHOCYTES # BLD: 18 % (ref 24–43)
MAGNESIUM SERPL-MCNC: 1.2 MG/DL (ref 1.6–2.6)
MCH RBC QN AUTO: 27.6 PG (ref 25.2–33.5)
MCHC RBC AUTO-ENTMCNC: 31.7 G/DL (ref 28.4–34.8)
MCV RBC AUTO: 87.1 FL (ref 82.6–102.9)
MICROORGANISM SPEC CULT: NORMAL
MONOCYTES # BLD: 13 % (ref 3–12)
NRBC AUTOMATED: 0 PER 100 WBC
PDW BLD-RTO: 13.9 % (ref 11.8–14.4)
PLATELET # BLD AUTO: 207 K/UL (ref 138–453)
PMV BLD AUTO: 10.5 FL (ref 8.1–13.5)
POTASSIUM SERPL-SCNC: 3.1 MMOL/L (ref 3.7–5.3)
PROT SERPL-MCNC: 6.3 G/DL (ref 6.4–8.3)
RBC # BLD: 3.8 M/UL (ref 3.95–5.11)
SEG NEUTROPHILS: 66 % (ref 36–65)
SEGMENTED NEUTROPHILS ABSOLUTE COUNT: 3.24 K/UL (ref 1.5–8.1)
SODIUM SERPL-SCNC: 141 MMOL/L (ref 135–144)
SPECIMEN DESCRIPTION: NORMAL
WBC # BLD AUTO: 4.9 K/UL (ref 3.5–11.3)

## 2023-03-17 PROCEDURE — 36415 COLL VENOUS BLD VENIPUNCTURE: CPT

## 2023-03-17 PROCEDURE — 85652 RBC SED RATE AUTOMATED: CPT

## 2023-03-17 PROCEDURE — 94640 AIRWAY INHALATION TREATMENT: CPT

## 2023-03-17 PROCEDURE — 6370000000 HC RX 637 (ALT 250 FOR IP): Performed by: EMERGENCY MEDICINE

## 2023-03-17 PROCEDURE — 2580000003 HC RX 258: Performed by: PEDIATRICS

## 2023-03-17 PROCEDURE — 86140 C-REACTIVE PROTEIN: CPT

## 2023-03-17 PROCEDURE — 6360000002 HC RX W HCPCS: Performed by: PEDIATRICS

## 2023-03-17 PROCEDURE — 6370000000 HC RX 637 (ALT 250 FOR IP): Performed by: PEDIATRICS

## 2023-03-17 PROCEDURE — 2580000003 HC RX 258: Performed by: EMERGENCY MEDICINE

## 2023-03-17 PROCEDURE — 99255 IP/OBS CONSLTJ NEW/EST HI 80: CPT | Performed by: INTERNAL MEDICINE

## 2023-03-17 PROCEDURE — 6360000002 HC RX W HCPCS: Performed by: EMERGENCY MEDICINE

## 2023-03-17 PROCEDURE — 83735 ASSAY OF MAGNESIUM: CPT

## 2023-03-17 PROCEDURE — 2700000000 HC OXYGEN THERAPY PER DAY

## 2023-03-17 PROCEDURE — 85025 COMPLETE CBC W/AUTO DIFF WBC: CPT

## 2023-03-17 PROCEDURE — 80053 COMPREHEN METABOLIC PANEL: CPT

## 2023-03-17 RX ORDER — PANTOPRAZOLE SODIUM 40 MG/1
40 TABLET, DELAYED RELEASE ORAL
Status: DISCONTINUED | OUTPATIENT
Start: 2023-03-18 | End: 2023-03-17 | Stop reason: HOSPADM

## 2023-03-17 RX ORDER — CLOPIDOGREL BISULFATE 75 MG/1
75 TABLET ORAL DAILY
Status: DISCONTINUED | OUTPATIENT
Start: 2023-03-17 | End: 2023-03-17 | Stop reason: HOSPADM

## 2023-03-17 RX ORDER — FEXOFENADINE HYDROCHLORIDE 60 MG/1
60 TABLET, FILM COATED ORAL DAILY
Status: DISCONTINUED | OUTPATIENT
Start: 2023-03-17 | End: 2023-03-17

## 2023-03-17 RX ORDER — ATORVASTATIN CALCIUM 40 MG/1
40 TABLET, FILM COATED ORAL DAILY
Status: DISCONTINUED | OUTPATIENT
Start: 2023-03-17 | End: 2023-03-17 | Stop reason: HOSPADM

## 2023-03-17 RX ORDER — OXYCODONE HYDROCHLORIDE 5 MG/1
5 TABLET ORAL EVERY 4 HOURS PRN
Status: DISCONTINUED | OUTPATIENT
Start: 2023-03-17 | End: 2023-03-17 | Stop reason: HOSPADM

## 2023-03-17 RX ORDER — CETIRIZINE HYDROCHLORIDE 10 MG/1
10 TABLET ORAL DAILY
Status: DISCONTINUED | OUTPATIENT
Start: 2023-03-17 | End: 2023-03-17 | Stop reason: HOSPADM

## 2023-03-17 RX ORDER — ALBUTEROL SULFATE 90 UG/1
2 AEROSOL, METERED RESPIRATORY (INHALATION) EVERY 6 HOURS PRN
Status: DISCONTINUED | OUTPATIENT
Start: 2023-03-17 | End: 2023-03-17 | Stop reason: HOSPADM

## 2023-03-17 RX ORDER — CYCLOBENZAPRINE HCL 10 MG
10 TABLET ORAL 3 TIMES DAILY PRN
Status: DISCONTINUED | OUTPATIENT
Start: 2023-03-17 | End: 2023-03-17 | Stop reason: HOSPADM

## 2023-03-17 RX ORDER — ONDANSETRON 4 MG/1
4 TABLET, FILM COATED ORAL EVERY 8 HOURS PRN
Status: DISCONTINUED | OUTPATIENT
Start: 2023-03-17 | End: 2023-03-17 | Stop reason: HOSPADM

## 2023-03-17 RX ORDER — BUDESONIDE AND FORMOTEROL FUMARATE DIHYDRATE 160; 4.5 UG/1; UG/1
2 AEROSOL RESPIRATORY (INHALATION) 2 TIMES DAILY
Status: DISCONTINUED | OUTPATIENT
Start: 2023-03-17 | End: 2023-03-17 | Stop reason: HOSPADM

## 2023-03-17 RX ORDER — ALBUTEROL SULFATE 2.5 MG/3ML
2.5 SOLUTION RESPIRATORY (INHALATION) EVERY 6 HOURS PRN
Status: DISCONTINUED | OUTPATIENT
Start: 2023-03-17 | End: 2023-03-17 | Stop reason: HOSPADM

## 2023-03-17 RX ORDER — FENTANYL CITRATE 50 UG/ML
25 INJECTION, SOLUTION INTRAMUSCULAR; INTRAVENOUS
Status: DISCONTINUED | OUTPATIENT
Start: 2023-03-17 | End: 2023-03-17 | Stop reason: HOSPADM

## 2023-03-17 RX ORDER — ESCITALOPRAM OXALATE 10 MG/1
10 TABLET ORAL DAILY
Status: DISCONTINUED | OUTPATIENT
Start: 2023-03-17 | End: 2023-03-17 | Stop reason: HOSPADM

## 2023-03-17 RX ORDER — BUDESONIDE AND FORMOTEROL FUMARATE DIHYDRATE 160; 4.5 UG/1; UG/1
2 AEROSOL RESPIRATORY (INHALATION) 2 TIMES DAILY
Status: DISCONTINUED | OUTPATIENT
Start: 2023-03-17 | End: 2023-03-17

## 2023-03-17 RX ORDER — IBUPROFEN 800 MG/1
400 TABLET ORAL EVERY 6 HOURS PRN
Status: DISCONTINUED | OUTPATIENT
Start: 2023-03-17 | End: 2023-03-17 | Stop reason: HOSPADM

## 2023-03-17 RX ADMIN — ACETAMINOPHEN 650 MG: 325 TABLET ORAL at 20:08

## 2023-03-17 RX ADMIN — OXYCODONE HYDROCHLORIDE 5 MG: 5 TABLET ORAL at 10:26

## 2023-03-17 RX ADMIN — ACETAMINOPHEN 650 MG: 325 TABLET ORAL at 14:05

## 2023-03-17 RX ADMIN — ACETAMINOPHEN 650 MG: 325 TABLET ORAL at 08:51

## 2023-03-17 RX ADMIN — ONDANSETRON 4 MG: 2 INJECTION INTRAMUSCULAR; INTRAVENOUS at 09:09

## 2023-03-17 RX ADMIN — OXYCODONE HYDROCHLORIDE 5 MG: 5 TABLET ORAL at 14:07

## 2023-03-17 RX ADMIN — SODIUM CHLORIDE, PRESERVATIVE FREE 20 ML: 5 INJECTION INTRAVENOUS at 14:13

## 2023-03-17 RX ADMIN — BUDESONIDE AND FORMOTEROL FUMARATE DIHYDRATE 2 PUFF: 160; 4.5 AEROSOL RESPIRATORY (INHALATION) at 17:52

## 2023-03-17 RX ADMIN — ONDANSETRON 4 MG: 2 INJECTION INTRAMUSCULAR; INTRAVENOUS at 14:13

## 2023-03-17 RX ADMIN — ENOXAPARIN SODIUM 40 MG: 40 INJECTION SUBCUTANEOUS at 08:51

## 2023-03-17 RX ADMIN — POTASSIUM BICARBONATE 20 MEQ: 782 TABLET, EFFERVESCENT ORAL at 08:50

## 2023-03-17 RX ADMIN — OXYCODONE HYDROCHLORIDE 5 MG: 5 TABLET ORAL at 20:08

## 2023-03-17 RX ADMIN — ONDANSETRON HYDROCHLORIDE 4 MG: 4 TABLET, FILM COATED ORAL at 20:08

## 2023-03-17 RX ADMIN — CEFTRIAXONE SODIUM 1000 MG: 10 INJECTION, POWDER, FOR SOLUTION INTRAVENOUS at 08:52

## 2023-03-17 ASSESSMENT — PAIN SCALES - GENERAL
PAINLEVEL_OUTOF10: 3
PAINLEVEL_OUTOF10: 7
PAINLEVEL_OUTOF10: 6
PAINLEVEL_OUTOF10: 7
PAINLEVEL_OUTOF10: 7

## 2023-03-17 ASSESSMENT — PAIN DESCRIPTION - DESCRIPTORS: DESCRIPTORS: STABBING

## 2023-03-17 ASSESSMENT — PAIN DESCRIPTION - ORIENTATION: ORIENTATION: MID;RIGHT

## 2023-03-17 ASSESSMENT — PAIN - FUNCTIONAL ASSESSMENT: PAIN_FUNCTIONAL_ASSESSMENT: PREVENTS OR INTERFERES SOME ACTIVE ACTIVITIES AND ADLS

## 2023-03-17 ASSESSMENT — PAIN DESCRIPTION - LOCATION
LOCATION: HEAD
LOCATION: ABDOMEN;FLANK;HIP

## 2023-03-17 NOTE — PLAN OF CARE
Problem: Discharge Planning  Goal: Discharge to home or other facility with appropriate resources  Outcome: Progressing     Problem: Pain  Goal: Verbalizes/displays adequate comfort level or baseline comfort level  3/17/2023 1710 by Florencia Barbosa RN  Outcome: Progressing  Flowsheets (Taken 3/17/2023 140)  Verbalizes/displays adequate comfort level or baseline comfort level:   Encourage patient to monitor pain and request assistance   Assess pain using appropriate pain scale   Administer analgesics based on type and severity of pain and evaluate response  3/17/2023 0524 by Erick Bolden RN  Outcome: Progressing     Problem: ABCDS Injury Assessment  Goal: Absence of physical injury  Outcome: Progressing     Problem: Chronic Conditions and Co-morbidities  Goal: Patient's chronic conditions and co-morbidity symptoms are monitored and maintained or improved  Outcome: Progressing

## 2023-03-17 NOTE — PROGRESS NOTES
Physician Progress Note      PATIENT:               Gregg Young  CSN #:                  [id number]  :                       1955  ADMIT DATE:       3/16/2023 3:43 PM  [facility] DATE:  Orin Ahumada  PROVIDER #:        Pelon Melchor MD          QUERY TEXT:    Pt admitted with UTI. Pt noted to have COPD on O2 @ 2 L/NC. 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 Thank you, please contact me for any questions. Paul Hamilton RN, [facility]  cell- [phone]  office hours - [id number]  Options provided:  -- Chronic respiratory failure with hypoxia  -- Chronic respiratory failure with hypercapnia  -- Chronic respiratory failure with hypoxia and hypercapnia  -- Other - I will add my own diagnosis  -- Disagree - Not applicable / Not valid  -- Disagree - Clinically unable to determine / Unknown  -- Refer to Clinical Documentation Reviewer    PROVIDER RESPONSE TEXT:    This patient has chronic respiratory failure with hypoxia.     Query created by: Nena Duong on 3/17/2023 11:20 AM      Electronically signed by:  Pelon Melchor MD 3/17/2023 2:34 PM

## 2023-03-17 NOTE — CARE COORDINATION
Patient's RN notified writer that the patient has a bed at [facility] room 8568. Transportation is arranged though [facility] and will pick the patient up around 8 pm.  Patient's RN is notified and agrees.

## 2023-03-17 NOTE — H&P
[address]  CDU / OBSERVATION ENCOUNTER      Pt Name: Vale Robertson  MRN: [id number]  [place] 1955  Date of evaluation: 3/17/23  Patient's PCP is :  Mariajose Blackmon MD    CHIEF COMPLAINT       Chief Complaint   Patient presents with    Flank Pain     Right x 3 weeks         HISTORY OF PRESENT ILLNESS    Vale Robertson is a 74 y.o. female w/ PMHx of COPD on 2 L Home O2, arthritis, blood clots, lung cancer, T2DM, HTN, who presents w/ 1 week of R sided abdominal and flank pain. UA concerning for UTI, culture pending. No leukocytosis, hypokalemia on admission. CT A/P obtained in ED with concern for carcinomatosis given patients known cancer diagnosis, diarrheal illness, and punctate non obstructing R renal stone. Appendix not identified. Repeat labs this AM stable, continued hypokalemia and hypomagnesemia. Per chart review patient has had repeated UTIs, was treated for persistent cystitis on 3/7 at [facility] and seen for R sided abdominal and flank pain at [facility] back in February 2023. Since admission patient remains afebrile, VSS. Location/Symptom: epigastrium, RUQ, R flank  Timing/Onset: months  Provocation: movement, rest, eating  Quality: sharp stabbing, aching  Radiation: R flank  Severity: severe  Timing/Duration: constant  Modifying Factors: improved with rest     History was obtained in part through review of the ED chart.  When possible, a direct discussion was had with ED nurses, residents, and attendings  REVIEW OF SYSTEMS       General ROS - No fevers, + malaise   Ophthalmic ROS - No discharge, No changes in vision  ENT ROS -  No sore throat, No rhinorrhea,   Respiratory ROS - no shortness of breath, no cough, no  wheezing  Cardiovascular ROS - No chest pain, no dyspnea on exertion  Gastrointestinal ROS - + abdominal pain, +nausea, no vomiting, no change in bowel habits, no black or bloody stools  Genito-Urinary ROS - +dysuria, trouble voiding, or hematuria  Musculoskeletal ROS - + myalgias, No arthalgias  Neurological ROS - No headache, no dizziness/lightheadedness, No focal weakness, no loss of sensation  Dermatological ROS - No lesions, No rash     (PQRS) Advance directives on face sheet per hospital policy. No change unless specifically mentioned in chart    PAST MEDICAL HISTORY    has a past medical history of Anxiety, Arthritis, COPD (chronic obstructive pulmonary disease) ([address] and Hx of blood clots. I have reviewed the past medical history with the patient and it is pertinent to this complaint. SURGICAL HISTORY      has a past surgical history that includes Breast surgery; Finger trigger release; Knee cartilage surgery (Bilateral); Hysterectomy; and toenail excision (Bilateral). I have reviewed and agree with Surgical History entered and it is pertinent to this complaint. CURRENT MEDICATIONS     ibuprofen (ADVIL;MOTRIN) tablet 400 mg, Q6H PRN  0.9 % sodium chloride infusion, Continuous  sodium chloride flush 0.9 % injection 5-40 mL, 2 times per day  sodium chloride flush 0.9 % injection 5-40 mL, PRN  0.9 % sodium chloride infusion, PRN  potassium chloride (KLOR-CON M) extended release tablet 40 mEq, PRN   Or  potassium bicarb-citric acid (EFFER-K) effervescent tablet 40 mEq, PRN   Or  potassium chloride 10 mEq/100 mL IVPB (Peripheral Line), PRN  enoxaparin (LOVENOX) injection 40 mg, Daily  ondansetron (ZOFRAN) injection 4 mg, Q4H PRN  polyethylene glycol (GLYCOLAX) packet 17 g, Daily PRN  acetaminophen (TYLENOL) tablet 650 mg, Q6H PRN   Or  acetaminophen (TYLENOL) suppository 650 mg, Q6H PRN        All medication charted and reviewed. ALLERGIES     is allergic to morphine, baclofen, fluoxetine, gabapentin, levofloxacin, loratadine, moxifloxacin, nitrofurantoin macrocrystal, olanzapine, pcn [penicillins], and tramadol. FAMILY HISTORY     has no family status information on file. family history is not on file.   The patient denies any pertinent family history. I have reviewed and agree with the family history entered. I have reviewed the Family History and it is not significant to the case    SOCIAL HISTORY      reports that she has been smoking cigarettes. She started smoking about 52 years ago. She has a 50.00 pack-year smoking history. She has never used smokeless tobacco.  I have reviewed and agree with all Social.  There are no concerns for substance abuse/use. PHYSICAL EXAM     INITIAL VITALS:  height is 5' 7\" (1.702 m) and weight is 171 lb 4.8 oz (77.7 kg). Her oral temperature is 97.3 °F (36.3 °C). Her blood pressure is 122/53 (abnormal) and her pulse is 77. Her respiration is 20 and oxygen saturation is 95%. CONSTITUTIONAL: AOx4, appears stated age, appears uncomfortable    HEAD: normocephalic, atraumatic   EYES: PERRLA, EOMI    ENT: moist mucous membranes, uvula midline   NECK: supple, symmetric   BACK: symmetric   LUNGS: clear to auscultation bilaterally   CARDIOVASCULAR: regular rate and rhythm, no murmurs, rubs or gallops   ABDOMEN: soft, diffusely tender more so in RUQ and epigastrium. R CVA tenderness    NEUROLOGIC:  MAEx4, no focal sensory or motor deficits   MUSCULOSKELETAL: no clubbing, cyanosis or edema   SKIN: no rash or wounds       DIFFERENTIAL DIAGNOSIS/MDM:     FROM ED MEDICAL DECISION MAKING NOTE:   William Dupree is a 74 y.o. female who presents with right-sided abdominal pain and right-sided flank pain. It has been progressively worsening over 3 weeks history of lung cancer, history of COPD, history of type 2 diabetes and hypertension. She wears 2 L nasal cannula at home not requiring anything more today. She has no shortness of breath or chest pain on review of systems. She denies vomiting but reports nausea. She is actually requesting for food in the room at this time. She rates her pain as 7 out of 10 starts in her lower abdomen and radiates to her right flank.      On exam she has mild tenderness in the right flank and right lower quadrant of the abdomen. Her belly soft, no peritoneal signs. Urinalysis concerning for urinary tract infection, will start on Keflex. Labs, urinalysis and CT abdomen without contrast stone protocol was ordered. There is no evidence of nephrolithiasis on CT scan however there was free fluid and concern for possible appendicitis without contrast use, radiology recommending a CT scan with IV contrast.  Pain medication provided. Patient signed out to Dr. Cornelio Mora pending CT with contrast.     DIAGNOSTIC RESULTS       RADIOLOGY:   I directly visualized the following  images and reviewed the radiologist interpretations:    CT ABDOMEN PELVIS WO CONTRAST Additional Contrast? None    Result Date: 3/16/2023  EXAMINATION: CT OF THE ABDOMEN AND PELVIS WITHOUT CONTRAST 3/16/2023 4:34 pm TECHNIQUE: CT of the abdomen and pelvis was performed without the administration of intravenous contrast. Multiplanar reformatted images are provided for review. Automated exposure control, iterative reconstruction, and/or weight based adjustment of the mA/kV was utilized to reduce the radiation dose to as low as reasonably achievable. COMPARISON: None. HISTORY: ORDERING SYSTEM PROVIDED HISTORY: right sided kidney stone TECHNOLOGIST PROVIDED HISTORY: right sided kidney stone Decision Support Exception - unselect if not a suspected or confirmed emergency medical condition->Emergency Medical Condition (MA) Reason for Exam: right sided kidney stone FINDINGS: LOWER CHEST:  Visualized portion of the lower chest demonstrates no acute abnormality. KIDNEYS AND URINARY TRACT: 3-5 mm nonobstructing right renal stones. . . There is no evidence for hydronephrosis. The ureters are of normal course and caliber. ORGANS: Visualized portions of the liver, spleen, pancreas, gallbladder, and adrenal glands demonstrate no acute abnormality. GI/BOWEL: No bowel obstruction.   Appendix is not well visualized in the right lower quadrant area. PELVIS: The bladder and pelvic organs are unremarkable. PERITONEUM/RETROPERITONEUM: No free air. There is a small amount of free fluid through the abdomen and pelvis predominantly surrounding the liver and in the right lower quadrant area and within the pelvis. There is also extensive amount of omental infiltration and thickening which is nonspecific. No pathologically enlarged lymphadenopathy. The vasculature do not demonstrate acute abnormality. BONES/SOFT TISSUES: The osseous structures demonstrate no acute abnormality. No evidence of obstructive uropathy. 3-5 mm nonobstructing right renal stones. There is a small amount of free fluid through the abdomen and pelvis predominantly surrounding the liver and in the right lower quadrant area and within the pelvis. The etiology of free fluid within the abdomen and pelvis is unclear. No discrete signs of cirrhosis is noted. There is also extensive amount of omental infiltration and thickening which is nonspecific. Although the finding can be seen in setting of fluid within peritoneal cavity, it can also be suggestive of intraperitoneal tumor infiltration. For further evaluation contrast enhanced CT of the abdomen and pelvis can be obtained. Appendix is not well visualized in the right lower quadrant area. Considering moderate amount of inflammatory change and fluid in the right lower quadrant area, there is need for further evaluation with oral and IV contrast to exclude acute appendicitis. CT ABDOMEN PELVIS W IV CONTRAST Additional Contrast? None    Result Date: 3/16/2023  EXAMINATION: CT OF THE ABDOMEN AND PELVIS WITH CONTRAST 3/16/2023 6:59 pm TECHNIQUE: CT of the abdomen and pelvis was performed with the administration of intravenous contrast. Multiplanar reformatted images are provided for review.  Automated exposure control, iterative reconstruction, and/or weight based adjustment of the mA/kV was utilized to reduce the radiation dose to as low as reasonably achievable. COMPARISON: Noncontrast CT today. HISTORY: ORDERING SYSTEM PROVIDED HISTORY: right flank pain, ct wo recommending contrast study TECHNOLOGIST PROVIDED HISTORY: right flank pain, ct wo recommending contrast study Decision Support Exception - unselect if not a suspected or confirmed emergency medical condition->Emergency Medical Condition (MA) FINDINGS: Lower Chest: No acute findings. Organs: No morphologic evidence for cirrhosis. Tiny hypoattenuating liver lesion in segment 8/5 is favored to represent a cyst.  There is probable fat deposition near the gallbladder fossa. The hepatic and portal veins are appropriately opacified. The gallbladder, pancreas, spleen and kidneys reveal no acute findings. Punctate stone in the superior pole the right kidney again noted. 1.7 cm right adrenal nodule head findings compatible with a benign adenoma on the noncontrast exam.  Left adrenal gland appears within normal limits. GI/Bowel: No evidence for bowel obstruction. Mild liquid stool in the proximal colon. The appendix is not identified. Pelvis: The bladder is well distended. No focal abnormality identified. Peritoneum/Retroperitoneum: Small volume abdominopelvic ascites is noted. Induration of the omentum is noted, greater than expected for the degree of underlying ascites. No loculated fluid collection identified. No enlarged or suspicious-appearing lymph nodes. The aorta is normal in kit caliber. Scattered atheromatous plaque is noted. The visceral branches are patent. Bones/Soft Tissues: Mild induration of the periumbilical region and small fat containing umbilical hernia. No acute osseous abnormality identified. 1.  Redemonstration of small volume abdominopelvic ascites and induration of the omentum. No morphologic evidence for cirrhosis on this exam.  Unless there is a known clinical explanation for these findings, peritoneal carcinomatosis remains the diagnosis of exclusion.  2. The appendix is not identified. No discrete bowel wall thickening identified. Liquid stool is present in the colon consistent with diarrheal process. 3.  Punctate nonobstructing right renal stone. 4.  Mild induration of the periumbilical fat and small fat containing hernia. 5.  Right adrenal nodule, consistent with a benign adenoma on the noncontrast exam.       LABS:  I have reviewed and interpreted all available lab results. Labs Reviewed   BASIC METABOLIC PANEL - Abnormal; Notable for the following components:       Result Value    Calcium 8.5 (*)     Potassium 3.0 (*)     All other components within normal limits   CBC WITH AUTO DIFFERENTIAL - Abnormal; Notable for the following components:    Seg Neutrophils 68 (*)     Lymphocytes 18 (*)     All other components within normal limits   URINALYSIS WITH REFLEX TO CULTURE - Abnormal; Notable for the following components:    Ketones, Urine TRACE (*)     Protein, UA 1+ (*)     Leukocyte Esterase, Urine MODERATE (*)     All other components within normal limits   CBC WITH AUTO DIFFERENTIAL - Abnormal; Notable for the following components:    RBC 3.80 (*)     Hemoglobin 10.5 (*)     Hematocrit 33.1 (*)     Seg Neutrophils 66 (*)     Lymphocytes 18 (*)     Monocytes 13 (*)     Absolute Lymph # 0.87 (*)     All other components within normal limits   COMPREHENSIVE METABOLIC PANEL W/ REFLEX TO MG FOR LOW K - Abnormal; Notable for the following components:    Glucose 106 (*)     Calcium 7.4 (*)     Potassium 3.1 (*)     Total Bilirubin 0.2 (*)     Total Protein 6.3 (*)     Albumin 3.4 (*)     All other components within normal limits   MAGNESIUM - Abnormal; Notable for the following components:    Magnesium 1.2 (*)     All other components within normal limits   CULTURE, URINE   MICROSCOPIC URINALYSIS         CDU IMPRESSION / Louise Alberta is a 74 y.o. female who presents with R sided abdominal and flank pain.       Complicated UTI:  - urine culture pending  - received 1 dose keflex in ED. Will start patient on rocephin while inpatient given recurrent cystitis. 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 - hem/onc consulted, appreciate recs     Hypokalemia:  - K 3.1, will give PO replacement       After discussion with patient she would like to be transferred to [facility] where her hem/onc physician and care team are. Will have disc made of recent imaging and transfer care. Continue home medications and pain control  Monitor vitals, labs, and imaging  DISPO: pending consults and clinical improvement    CONSULTS:    IP CONSULT TO HEM/ONC    PROCEDURES:  Not indicated       PATIENT REFERRED TO:    No follow-up provider specified. --  Ney Zavala, DO   Emergency Medicine     This dictation was generated by voice recognition computer software. Although all attempts are made to edit the dictation for accuracy, there may be errors in the transcription that are not intended.

## 2023-03-17 NOTE — DISCHARGE INSTR - COC
Continuity of Care Form    Patient Name: Tony Saenz   :  1955  MRN:  [id number]    Admit date:  3/16/2023  Discharge date:  ***    Code Status Order: Full Code   Advance Directives:     Admitting Physician:  Warner Landau, MD  PCP: Rj Winston MD    Discharging Nurse: [facility] Unit/Room#: 0321/0321-02  Discharging Unit Phone Number: ***    Emergency Contact:   Extended Emergency Contact Information  Primary Emergency Contact: Dana Goodwin  Home Phone: [phone]  Relation: Child  Secondary Emergency Contact: [address] Phone: [phone]  Relation: Child    Past Surgical History:  Past Surgical History:   Procedure Laterality Date    BREAST SURGERY      lumpectomy    HYSTERECTOMY      KNEE CARTILAGE SURGERY Bilateral     TOENAIL EXCISION Bilateral     all    TRIGGER FINGER RELEASE         Immunization History:   Immunization History   Administered Date(s) Administered    COVID-19, MODERNA BLUE border, Primary or Immunocompromised, (age 12y+), IM, 100 mcg/0.5mL 2022, 2022    Influenza Virus Vaccine 11/10/2011, 2013, 2017, 2020    Pneumococcal Polysaccharide ([id number]) 11/10/2011       Active Problems:  Patient Active Problem List   Diagnosis Code    Type 2 diabetes mellitus without complications ([facility] X87.4    Personal history of nicotine dependence Z87.891    Mixed bipolar I disorder ([facility] F31.60    Essential hypertension I10    Gastroesophageal reflux disease K21.9    Long term (current) use of antithrombotics/antiplatelets L89.44    Prsnl hx of TIA (TIA), and cereb infrc w/o resid deficits Z86.73    Peripheral vascular disease, unspecified (HCC) I73.9    Chronic hepatitis C ([facility] B18.2    Abdominal pain R10.9       Isolation/Infection:   Isolation            No Isolation          Patient Infection Status       None to display            Nurse Assessment:  Last Vital Signs: BP (!) 113/52   Pulse 72   Temp 97.8 °F (36.6 °C) (Oral)   Resp 16   Ht 5' 7\" (1.702 m)   Wt 171 lb 4.8 oz (77.7 kg)   SpO2 97%   BMI 26.83 kg/m²     Last documented pain score (0-10 scale): Pain Level: 6  Last Weight:   Wt Readings from Last 1 Encounters:   23 171 lb 4.8 oz (77.7 kg)     Mental Status:  {IP PT MENTAL STATUS:11979}    IV Access:  { JENNIFER IV ACCESS:103771319}    Nursing Mobility/ADLs:  Walking   {CHP DME BSSV:656916713}  Transfer  {CHP DME CNKK:224885382}  Bathing  {CHP DME JPBR:037557959}  Dressing  {CHP DME ZMJH:145494883}  Toileting  {CHP DME VCSW:876736548}  Feeding  {CHP DME IGUB:933953035}  Med Admin  {CHP DME PAUM:547763845}  Med Delivery   { JENNIFER MED Delivery:815701041}    Wound Care Documentation and Therapy:        Elimination:  Continence: Bowel: {YES / MD:79052}  Bladder: {YES / QC:11514}  Urinary Catheter: {Urinary Catheter:830959874}   Colostomy/Ileostomy/Ileal Conduit: {YES / IM:86181}       Date of Last BM: ***  No intake or output data in the 24 hours ending 23 1223  No intake/output data recorded.     Safety Concerns:     [address] Safety Concerns:190016552}    Impairments/Disabilities:      [address] Impairments/Disabilities:328520256}    Nutrition Therapy:  Current Nutrition Therapy:   [address] Diet List:297434774}    Routes of Feeding: {CHP DME Other Feedings:264751709}  Liquids: {Slp liquid thickness:17536}  Daily Fluid Restriction: {CHP DME Yes amt example:868564609}  Last Modified Barium Swallow with Video (Video Swallowing Test): {Done Not Done BRAS:870793432}    Treatments at the Time of Hospital Discharge:   Respiratory Treatments: ***  Oxygen Therapy:  {Therapy; copd oxygen:61008}  Ventilator:    {[facility] Vent MICE:150374387}    Rehab Therapies: {THERAPEUTIC INTERVENTION:0885788189}  Weight Bearing Status/Restrictions: [address] HALE Weight Bearin}  Other Medical Equipment (for information only, NOT a DME order):  {EQUIPMENT:195468535}  Other Treatments: ***    Patient's personal belongings (please select all that are sent with patient):  {SHOAIB DELUCA Belongings:406233294}    RN SIGNATURE:  {Esignature:223232479}    CASE MANAGEMENT/SOCIAL WORK SECTION    Inpatient Status Date: ***    Readmission Risk Assessment Score:  Readmission Risk              Risk of Unplanned Readmission:  13           Discharging to Facility/ Agency   Name:   Address:  Phone:  Fax:    Dialysis Facility (if applicable)   Name:  Address:  Dialysis Schedule:  Phone:  Fax:    / signature: {Esignature:756460728}    PHYSICIAN SECTION    Prognosis: {Prognosis:0238346614}    Condition at Discharge: [address] [place] [address] Patient Condition:911302518}    Rehab Potential (if transferring to Rehab): {Prognosis:5378691733}    Recommended Labs or Other Treatments After Discharge: ***    Physician Certification: I certify the above information and transfer of Junie Costello  is necessary for the continuing treatment of the diagnosis listed and that she requires {Admit to Appropriate Level of Care:29670} for {GREATER/LESS:302901695} 30 days.      Update Admission H&P: {SHOAIB DELUCA Changes in NGIJQ:800998093}    PHYSICIAN SIGNATURE:  {Esignature:131501009}

## 2023-03-17 NOTE — DISCHARGE SUMMARY
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 Treated with analgesia and transfer to tertiary center where she is typically cared for. 2.  Hypokalemia, corrected with oral supplements. Follow-up:  Call today/tomorrow for a follow up appointment with Davi Marie MD , or return to the Emergency Room with worsening symptoms    Stressed to patient the importance of following up with primary care doctor for further workup/management of symptoms. Pt verbalizes understanding and agrees with plan.     Discharge Medication Changes:       Medication List        ASK your doctor about these medications      * albuterol (2.5 MG/3ML) 0.083% nebulizer solution  Commonly known as: PROVENTIL     * albuterol sulfate  (90 Base) MCG/ACT inhaler  Commonly known as: PROVENTIL;VENTOLIN;PROAIR     aspirin 81 MG tablet     atorvastatin 40 MG tablet  Commonly known as: LIPITOR     budesonide-formoterol 160-4.5 MCG/ACT Aero  Commonly known as: SYMBICORT     cetirizine 10 MG tablet  Commonly known as: ZYRTEC     clopidogrel 75 MG tablet  Commonly known as: PLAVIX     cyclobenzaprine 10 MG tablet  Commonly known as: FLEXERIL     escitalopram 10 MG tablet  Commonly known as: LEXAPRO     fexofenadine 60 MG tablet  Commonly known as: ALLEGRA     fluticasone 50 MCG/ACT nasal spray  Commonly known as: FLONASE     ibuprofen 400 MG tablet  Commonly known as: ADVIL;MOTRIN     naproxen 500 MG tablet  Commonly known as: NAPROSYN     omeprazole 20 MG delayed release capsule  Commonly known as: PRILOSEC     ondansetron 4 MG tablet  Commonly known as: ZOFRAN     pantoprazole 40 MG tablet  Commonly known as: PROTONIX     polyethylene glycol 17 GM/SCOOP powder  Commonly known as: GLYCOLAX     QUEtiapine 50 MG extended release tablet  Commonly known as: SEROQUEL XR     Spiriva HandiHaler 18 MCG inhalation capsule  Generic drug: tiotropium     tamsulosin 0.4 MG capsule  Commonly known as: FLOMAX           * This list has 2 medication(s) that are the same as other medications prescribed for you. Read the directions carefully, and ask your doctor or other care provider to review them with you. Diet:  ADULT DIET;  Regular, advance as tolerated       Activity:  As tolerated    Consultants: IP CONSULT TO HEM/ONC    Procedures:  Not indicated      Diagnostic Test:   Results for orders placed or performed during the hospital encounter of 03/16/23   BMP   Result Value Ref Range    Glucose 89 70 - 99 mg/dL    BUN 12 8 - 23 mg/dL    Creatinine 0.62 0.50 - 0.90 mg/dL    Est, Glom Filt Rate >60 >60 mL/min/1.73m2    Calcium 8.5 (L) 8.6 - 10.4 mg/dL    Sodium 137 135 - 144 mmol/L    Potassium 3.0 (L) 3.7 - 5.3 mmol/L    Chloride 98 98 - 107 mmol/L    CO2 25 20 - 31 mmol/L    Anion Gap 14 9 - 17 mmol/L   CBC with Auto Differential   Result Value Ref Range    WBC 7.2 3.5 - 11.3 k/uL    RBC 4.72 3.95 - 5.11 m/uL    Hemoglobin 13.1 11.9 - 15.1 g/dL    Hematocrit 41.2 36.3 - 47.1 %    MCV 87.3 82.6 - 102.9 fL    MCH 27.8 25.2 - 33.5 pg    MCHC 31.8 28.4 - 34.8 g/dL    RDW 14.0 11.8 - 14.4 %    Platelets 524 613 - 064 k/uL    MPV 10.3 8.1 - 13.5 fL    NRBC Automated 0.0 0.0 per 100 WBC    Seg Neutrophils 68 (H) 36 - 65 %    Lymphocytes 18 (L) 24 - 43 %    Monocytes 12 3 - 12 %    Eosinophils % 1 1 - 4 %    Basophils 1 0 - 2 %    Immature Granulocytes 0 0 %    Segs Absolute 4.87 1.50 - 8.10 k/uL    Absolute Lymph # 1.31 1.10 - 3.70 k/uL    Absolute Mono # 0.84 0.10 - 1.20 k/uL    Absolute Eos # 0.08 0.00 - 0.44 k/uL    Basophils Absolute 0.06 0.00 - 0.20 k/uL    Absolute Immature Granulocyte <0.03 0.00 - 0.30 k/uL   Urinalysis with Reflex to Culture    Specimen: Urine   Result Value Ref Range    Color, UA Yellow Yellow Turbidity UA Clear Clear    Glucose, Ur NEGATIVE NEGATIVE    Bilirubin Urine NEGATIVE NEGATIVE    Ketones, Urine TRACE (A) NEGATIVE    Specific Gravity, UA 1.020 1.005 - 1.030    Urine Hgb NEGATIVE NEGATIVE    pH, UA 6.0 5.0 - 8.0    Protein, UA 1+ (A) NEGATIVE    Urobilinogen, Urine Normal Normal    Nitrite, Urine NEGATIVE NEGATIVE    Leukocyte Esterase, Urine MODERATE (A) NEGATIVE   Microscopic Urinalysis   Result Value Ref Range    WBC, UA 50  0 - 5 /HPF    RBC, UA 2 TO 5 0 - 4 /HPF    Casts UA  0 - 8 /LPF     10 TO 20 HYALINE Reference range defined for non-centrifuged specimen.     Epithelial Cells UA 0 TO 2 0 - 5 /HPF   CBC with Auto Differential   Result Value Ref Range    WBC 4.9 3.5 - 11.3 k/uL    RBC 3.80 (L) 3.95 - 5.11 m/uL    Hemoglobin 10.5 (L) 11.9 - 15.1 g/dL    Hematocrit 33.1 (L) 36.3 - 47.1 %    MCV 87.1 82.6 - 102.9 fL    MCH 27.6 25.2 - 33.5 pg    MCHC 31.7 28.4 - 34.8 g/dL    RDW 13.9 11.8 - 14.4 %    Platelets 468 369 - 378 k/uL    MPV 10.5 8.1 - 13.5 fL    NRBC Automated 0.0 0.0 per 100 WBC    Seg Neutrophils 66 (H) 36 - 65 %    Lymphocytes 18 (L) 24 - 43 %    Monocytes 13 (H) 3 - 12 %    Eosinophils % 2 1 - 4 %    Basophils 1 0 - 2 %    Immature Granulocytes 0 0 %    Segs Absolute 3.24 1.50 - 8.10 k/uL    Absolute Lymph # 0.87 (L) 1.10 - 3.70 k/uL    Absolute Mono # 0.65 0.10 - 1.20 k/uL    Absolute Eos # 0.10 0.00 - 0.44 k/uL    Basophils Absolute 0.04 0.00 - 0.20 k/uL    Absolute Immature Granulocyte <0.03 0.00 - 0.30 k/uL   Comprehensive Metabolic Panel w/ Reflex to MG   Result Value Ref Range    Glucose 106 (H) 70 - 99 mg/dL    BUN 9 8 - 23 mg/dL    Creatinine 0.56 0.50 - 0.90 mg/dL    Est, Glom Filt Rate >60 >60 mL/min/1.73m2    Calcium 7.4 (L) 8.6 - 10.4 mg/dL    Sodium 141 135 - 144 mmol/L    Potassium 3.1 (L) 3.7 - 5.3 mmol/L    Chloride 104 98 - 107 mmol/L    CO2 27 20 - 31 mmol/L    Anion Gap 10 9 - 17 mmol/L    Alkaline Phosphatase 80 35 - 104 U/L    ALT 7 5 - 33 U/L    AST 12 <32 U/L    Total Bilirubin 0.2 (L) 0.3 - 1.2 mg/dL    Total Protein 6.3 (L) 6.4 - 8.3 g/dL    Albumin 3.4 (L) 3.5 - 5.2 g/dL    Albumin/Globulin Ratio 1.2 1.0 - 2.5   Magnesium   Result Value Ref Range    Magnesium 1.2 (L) 1.6 - 2.6 mg/dL     CT ABDOMEN PELVIS WO CONTRAST Additional Contrast? None    Result Date: 3/16/2023  EXAMINATION: CT OF THE ABDOMEN AND PELVIS WITHOUT CONTRAST 3/16/2023 4:34 pm TECHNIQUE: CT of the abdomen and pelvis was performed without the administration of intravenous contrast. Multiplanar reformatted images are provided for review. Automated exposure control, iterative reconstruction, and/or weight based adjustment of the mA/kV was utilized to reduce the radiation dose to as low as reasonably achievable. COMPARISON: None. HISTORY: ORDERING SYSTEM PROVIDED HISTORY: right sided kidney stone TECHNOLOGIST PROVIDED HISTORY: right sided kidney stone Decision Support Exception - unselect if not a suspected or confirmed emergency medical condition->Emergency Medical Condition (MA) Reason for Exam: right sided kidney stone FINDINGS: LOWER CHEST:  Visualized portion of the lower chest demonstrates no acute abnormality. KIDNEYS AND URINARY TRACT: 3-5 mm nonobstructing right renal stones. . . There is no evidence for hydronephrosis. The ureters are of normal course and caliber. ORGANS: Visualized portions of the liver, spleen, pancreas, gallbladder, and adrenal glands demonstrate no acute abnormality. GI/BOWEL: No bowel obstruction. Appendix is not well visualized in the right lower quadrant area. PELVIS: The bladder and pelvic organs are unremarkable. PERITONEUM/RETROPERITONEUM: No free air. There is a small amount of free fluid through the abdomen and pelvis predominantly surrounding the liver and in the right lower quadrant area and within the pelvis. There is also extensive amount of omental infiltration and thickening which is nonspecific. No pathologically enlarged lymphadenopathy.   The vasculature do not demonstrate acute abnormality. BONES/SOFT TISSUES: The osseous structures demonstrate no acute abnormality. No evidence of obstructive uropathy. 3-5 mm nonobstructing right renal stones. There is a small amount of free fluid through the abdomen and pelvis predominantly surrounding the liver and in the right lower quadrant area and within the pelvis. The etiology of free fluid within the abdomen and pelvis is unclear. No discrete signs of cirrhosis is noted. There is also extensive amount of omental infiltration and thickening which is nonspecific. Although the finding can be seen in setting of fluid within peritoneal cavity, it can also be suggestive of intraperitoneal tumor infiltration. For further evaluation contrast enhanced CT of the abdomen and pelvis can be obtained. Appendix is not well visualized in the right lower quadrant area. Considering moderate amount of inflammatory change and fluid in the right lower quadrant area, there is need for further evaluation with oral and IV contrast to exclude acute appendicitis. CT ABDOMEN PELVIS W IV CONTRAST Additional Contrast? None    Result Date: 3/16/2023  EXAMINATION: CT OF THE ABDOMEN AND PELVIS WITH CONTRAST 3/16/2023 6:59 pm TECHNIQUE: CT of the abdomen and pelvis was performed with the administration of intravenous contrast. Multiplanar reformatted images are provided for review. Automated exposure control, iterative reconstruction, and/or weight based adjustment of the mA/kV was utilized to reduce the radiation dose to as low as reasonably achievable. COMPARISON: Noncontrast CT today.  HISTORY: ORDERING SYSTEM PROVIDED HISTORY: right flank pain, ct wo recommending contrast study TECHNOLOGIST PROVIDED HISTORY: right flank pain, ct wo recommending contrast study Decision Support Exception - unselect if not a suspected or confirmed emergency medical condition->Emergency Medical Condition (MA) FINDINGS: Lower Chest: No acute findings. Organs: No morphologic evidence for cirrhosis. Tiny hypoattenuating liver lesion in segment 8/5 is favored to represent a cyst.  There is probable fat deposition near the gallbladder fossa. The hepatic and portal veins are appropriately opacified. The gallbladder, pancreas, spleen and kidneys reveal no acute findings. Punctate stone in the superior pole the right kidney again noted. 1.7 cm right adrenal nodule head findings compatible with a benign adenoma on the noncontrast exam.  Left adrenal gland appears within normal limits. GI/Bowel: No evidence for bowel obstruction. Mild liquid stool in the proximal colon. The appendix is not identified. Pelvis: The bladder is well distended. No focal abnormality identified. Peritoneum/Retroperitoneum: Small volume abdominopelvic ascites is noted. Induration of the omentum is noted, greater than expected for the degree of underlying ascites. No loculated fluid collection identified. No enlarged or suspicious-appearing lymph nodes. The aorta is normal in kit caliber. Scattered atheromatous plaque is noted. The visceral branches are patent. Bones/Soft Tissues: Mild induration of the periumbilical region and small fat containing umbilical hernia. No acute osseous abnormality identified. 1.  Redemonstration of small volume abdominopelvic ascites and induration of the omentum. No morphologic evidence for cirrhosis on this exam.  Unless there is a known clinical explanation for these findings, peritoneal carcinomatosis remains the diagnosis of exclusion. 2.  The appendix is not identified. No discrete bowel wall thickening identified. Liquid stool is present in the colon consistent with diarrheal process. 3.  Punctate nonobstructing right renal stone. 4.  Mild induration of the periumbilical fat and small fat containing hernia.  5.  Right adrenal nodule, consistent with a benign adenoma on the noncontrast exam.           Physical Exam:    General appearance - NAD, AOx 3    Lungs -CTAB, no R/R/R  Heart - RRR, no M/R/G  Abdomen - Soft, NT/ND  Neurological:  MAEx4, No focal motor deficit, sensory loss  Extremities - Cap refil <2 sec in all ext., no edema  Skin -warm, dry      Hospital Course:  Clinical course has improved, labs and imaging reviewed. Deanna Falcon originally presented to the hospital on 3/16/2023  3:43 PM with abdominal pain. New omental metastasis suspected. .  At that time it was determined that She required further observation and further work-up including evaluation by oncologist.  Patient was asking to be transferred to [facility] where she typically is cared for. . Labs and imaging were followed daily. Imaging results as above. She is medically stable to be discharged. Disposition: Home    Patient stated that they will not drive themselves home from the hospital if they have gotten pain killers/ narcotics earlier that day and that they will arrange for transportation on their own or work with the  for a ride. Patient counseled NOT to drive while under the influence of narcotics/ pain killers. Condition: Good    Patient stable and ready for discharge home. I have discussed plan of care with patient and they are in understanding. They were instructed to read discharge paperwork. All of their questions and concerns were addressed. Time Spent: 1 day      --  Cynthia Klein MD  Emergency Medicine Attending Physician    This dictation was generated by voice recognition computer software. Although all attempts are made to edit the dictation for accuracy, there may be errors in the transcription that are not intended.

## 2023-03-17 NOTE — CARE COORDINATION
Transitional Planning  Notified per RN, patient is being transferred to [facility]. Requests face sheet to be faxed to [phone]    PS Dr. Xochilt Singer regarding transitional plan clarification. Spoke with Dr. Tuan Padilla, he did call [facility], has accepting MD, no bed available, [facility] will call 3 B when bed available. 428 pm Called [facility] transfer line at [phone], spoke with Margot Mcrae, they had a few discharges waiting for beds to be cleaned and will call once bed available.

## 2023-03-17 NOTE — PROGRESS NOTES
[address]  CDU / OBSERVATION ENCOUNTER  ATTENDING NOTE         Discussed case with hospitalist at [facility]. Patient is excepted for care there with her own oncologist.  Patient is asking for transfer and I believe this to be her best treatment given that she has complex case and will do well in a environment where she has been well-known and continued of care compression proceed. Patient transfers in good condition stable and awaiting bed    We will send records associated with this visit.       Jean Santizo MD  Attending Emergency  Physician

## 2023-03-17 NOTE — CONSULTS
Today's Date: 3/17/2023  Patient Name: Ольга Baker  Date of admission: 3/16/2023  3:43 PM  Patient's age: 74 y.o., 1955  Admission Dx: Abdominal pain [R10.9]  Flank pain [R10.9]  Right lower quadrant abdominal pain [R10.31]    Reason for Consult: management recommendations  Requesting Physician: Maritza Roberto MD    CHIEF COMPLAINT: Abdominal pain    History Obtained From:  patient, electronic medical record    HISTORY OF PRESENT ILLNESS:      The patient is a 74 y.o.  female who is admitted  to the hospital with chief complaint of right-sided abdominal pain and right-sided flank pain. According the patient has been progressively getting worse over the last 3 weeks. Patient was also in the ER at the [facility] to [facility]. Patient on home O2. Patient lab work-up shows potassium 3.1 magnesium 1.2. Creatinine is within range. Patient hemoglobin is 10.5. CT scan with IV contrast showed small volume ascites and induration of omentum. No evidence of cirrhosis. There is concern regarding peritoneal carcinomatosis. Patient recently had a CT chest in February 2023 which showed slight decrease in size of the left upper lobe and right lower lobe nodules which measured 9 mm and 11 mm. There was no evidence of disease progression otherwise. Patient has a history of lung cancer diagnosed in June 2022. We do not have detailed medical records regarding patient treatment history. Patient underwent radiation therapy and it appears she has not had any chemo. Patient follows up with Dr. Giovanni Styles at [place] [id number]. Per patient's OARRS report patient takes oxycodone occasionally. Last prescription was filled on 12/16/2022. Past Medical History:   has a past medical history of Anxiety, Arthritis, COPD (chronic obstructive pulmonary disease) ([address] and Hx of blood clots. Past Surgical History:   has a past surgical history that includes Breast surgery;  Finger trigger release; Knee cartilage surgery (Bilateral); Hysterectomy; and toenail excision (Bilateral). Medications:    Prior to Admission medications    Medication Sig Start Date End Date Taking? Authorizing Provider   tiotropium (SPIRIVA HANDIHALER) 18 MCG inhalation capsule Spiriva with HandiHaler 18 mcg and inhalation capsules  Patient not taking: Reported on 3/17/2023    Historical Provider, MD   tamsulosin (FLOMAX) 0.4 MG capsule tamsulosin 0.4 mg capsule   TAKE ONE CAPSULE BY MOUTH EVERY DAY  Patient not taking: Reported on 3/17/2023    Historical Provider, MD   polyethylene glycol (GLYCOLAX) 17 GM/SCOOP powder polyethylene glycol 3350 17 gram oral powder packet  Patient not taking: Reported on 3/17/2023    Historical Provider, MD   pantoprazole (PROTONIX) 40 MG tablet pantoprazole 40 mg tablet,delayed release   TAKE ONE TABLET BY MOUTH TWICE DAILY    Historical Provider, MD   ondansetron (ZOFRAN) 4 MG tablet ondansetron HCl 4 mg tablet   TAKE ONE TABLET BY MOUTH EVERY 6 TO 8 HOURS AS NEEDED FOR 3 DAYS    Historical Provider, MD   naproxen (NAPROSYN) 500 MG tablet naproxen 500 mg tablet   TAKE ONE TABLET BY MOUTH TWICE DAILY FOR 7 DAYS  Patient not taking: Reported on 3/17/2023    Historical Provider, MD   ibuprofen (ADVIL;MOTRIN) 400 MG tablet ibuprofen 400 mg tablet  Patient not taking: Reported on 3/17/2023    Historical Provider, MD   fluticasone (FLONASE) 50 MCG/ACT nasal spray fluticasone propionate 50 mcg/actuation nasal spray,suspension  Patient not taking: Reported on 3/17/2023    Historical Provider, MD   escitalopram (LEXAPRO) 10 MG tablet escitalopram 10 mg tablet   TAKE ONE TABLET BY MOUTH EVERY DAY    Historical Provider, MD   cetirizine (ZYRTEC) 10 MG tablet cetirizine 10 mg tablet   TAKE ONE TABLET BY MOUTH EVERY DAY    Historical Provider, MD   atorvastatin (LIPITOR) 40 MG tablet atorvastatin 40 mg tablet    Historical Provider, MD   QUEtiapine (SEROQUEL XR) 50 MG extended release tablet Take 50 mg by mouth nightly.   Patient not taking: Reported on 3/17/2023    Historical Provider, MD   clopidogrel (PLAVIX) 75 MG tablet Take 75 mg by mouth daily. Historical Provider, MD   cyclobenzaprine (FLEXERIL) 10 MG tablet Take 10 mg by mouth 3 times daily as needed for Muscle spasms. Historical Provider, MD   albuterol (PROVENTIL) (2.5 MG/3ML) 0.083% nebulizer solution Take 2.5 mg by nebulization every 6 hours as needed for Wheezing. Historical Provider, MD   fexofenadine (ALLEGRA) 60 MG tablet Take 60 mg by mouth daily. Historical Provider, MD   aspirin 81 MG tablet Take 81 mg by mouth daily. Patient not taking: Reported on 3/17/2023    Historical Provider, MD   omeprazole (PRILOSEC) 20 MG capsule Take 20 mg by mouth daily. Historical Provider, MD   albuterol (PROVENTIL HFA;VENTOLIN HFA) 108 (90 BASE) MCG/ACT inhaler Inhale 2 puffs into the lungs every 6 hours as needed for Wheezing. Historical Provider, MD   budesonide-formoterol (SYMBICORT) 160-4.5 MCG/ACT AERO Inhale 2 puffs into the lungs 2 times daily.     Historical Provider, MD     Current Facility-Administered Medications   Medication Dose Route Frequency Provider Last Rate Last Admin    ibuprofen (ADVIL;MOTRIN) tablet 400 mg  400 mg Oral Q6H PRN Russell Benavides DO        cefTRIAXone (ROCEPHIN) 1,000 mg in sterile water 10 mL IV syringe  1,000 mg IntraVENous Q24H Selam Serrato DO        0.9 % sodium chloride infusion   IntraVENous Continuous Ilana Kitchen  mL/hr at 03/16/23 2305 New Bag at 03/16/23 2305    sodium chloride flush 0.9 % injection 5-40 mL  5-40 mL IntraVENous 2 times per day Ilana Kitchen MD   10 mL at 03/16/23 2245    sodium chloride flush 0.9 % injection 5-40 mL  5-40 mL IntraVENous PRN Ilana Kitchen MD        0.9 % sodium chloride infusion  25 mL IntraVENous PRN Ilana Kitchen MD        potassium chloride (KLOR-CON M) extended release tablet 40 mEq  40 mEq Oral PRN Ilana Kitchen MD        Or    potassium bicarb-citric acid (EFFER-K) effervescent tablet 40 mEq  40 mEq Oral PRN Kira Catalan MD        Or    potassium chloride 10 mEq/100 mL IVPB (Peripheral Line)  10 mEq IntraVENous PRN Kira Catalan MD        enoxaparin (LOVENOX) injection 40 mg  40 mg SubCUTAneous Daily Kira Catalan MD        ondansetron (ZOFRAN) injection 4 mg  4 mg IntraVENous Q4H PRN Kira Catalan MD   4 mg at 03/16/23 2305    polyethylene glycol (GLYCOLAX) packet 17 g  17 g Oral Daily PRN Kira Catalan MD        acetaminophen (TYLENOL) tablet 650 mg  650 mg Oral Q6H PRN Kira Catalan MD        Or    acetaminophen (TYLENOL) suppository 650 mg  650 mg Rectal Q6H PRN Kira Catalan MD           Allergies:  Morphine, Baclofen, Fluoxetine, Gabapentin, Levofloxacin, Loratadine, Moxifloxacin, Nitrofurantoin macrocrystal, Olanzapine, Pcn [penicillins], and Tramadol    Social History:   reports that she has been smoking cigarettes. She started smoking about 52 years ago. She has a 50.00 pack-year smoking history. She has never used smokeless tobacco.     Family History: Hypertension    REVIEW OF SYSTEMS:      Constitutional: No fever or chills. No night sweats, no weight loss   Eyes: No eye discharge, double vision, or eye pain   HEENT: negative for sore mouth, sore throat, hoarseness and voice change   Respiratory: negative for cough , sputum, dyspnea, wheezing, hemoptysis, chest pain   Cardiovascular: negative for chest pain, dyspnea, palpitations, orthopnea, PND   Gastrointestinal: negative for nausea, vomiting, diarrhea, constipation, dysphagia, hematemesis and hematochezia. Positive for abdominal pain  Genitourinary: negative for frequency, dysuria, nocturia, urinary incontinence, and hematuria   Integument: negative for rash, skin lesions, bruises.    Hematologic/Lymphatic: negative for easy bruising, bleeding, lymphadenopathy, or petechiae   Endocrine: negative for heat or cold intolerance,weight changes, change in bowel habits and hair loss   Musculoskeletal: negative for myalgias, arthralgias, pain, joint swelling,and bone pain   Neurological: negative for headaches, dizziness, seizures, weakness, numbness    PHYSICAL EXAM:        BP (!) 122/53   Pulse 77   Temp 97.3 °F (36.3 °C) (Oral)   Resp 20   Ht 5' 7\" (1.702 m)   Wt 171 lb 4.8 oz (77.7 kg)   SpO2 95%   BMI 26.83 kg/m²    Temp (24hrs), Av.2 °F (36.2 °C), Min:97 °F (36.1 °C), Max:97.3 °F (36.3 °C)      General appearance - well appearing, no in pain or distress   Mental status - alert and cooperative   Eyes - pupils equal and reactive, extraocular eye movements intact   Ears - bilateral TM's and external ear canals normal   Mouth - mucous membranes moist, pharynx normal without lesions   Neck - supple, no significant adenopathy   Lymphatics - no palpable lymphadenopathy, no hepatosplenomegaly   Chest - clear to auscultation, no wheezes, rales or rhonchi, symmetric air entry   Heart - normal rate, regular rhythm, normal S1, S2, no murmurs  Abdomen -positive right-sided abdominal pain  Neurological - alert, oriented, normal speech, no focal findings or movement disorder noted   Musculoskeletal - no joint tenderness, deformity or swelling   Extremities - peripheral pulses normal, no pedal edema, no clubbing or cyanosis   Skin - normal coloration and turgor, no rashes, no suspicious skin lesions noted ,      DATA:      Labs:     Results for orders placed or performed during the hospital encounter of 23   BMP   Result Value Ref Range    Glucose 89 70 - 99 mg/dL    BUN 12 8 - 23 mg/dL    Creatinine 0.62 0.50 - 0.90 mg/dL    Est, Glom Filt Rate >60 >60 mL/min/1.73m2    Calcium 8.5 (L) 8.6 - 10.4 mg/dL    Sodium 137 135 - 144 mmol/L    Potassium 3.0 (L) 3.7 - 5.3 mmol/L    Chloride 98 98 - 107 mmol/L    CO2 25 20 - 31 mmol/L    Anion Gap 14 9 - 17 mmol/L   CBC with Auto Differential   Result Value Ref Range    WBC 7.2 3.5 - 11.3 k/uL    RBC 4.72 3.95 - 5.11 m/uL    Hemoglobin 13.1 11.9 - 15.1 g/dL    Hematocrit 41.2 36.3 - 47.1 % MCV 87.3 82.6 - 102.9 fL    MCH 27.8 25.2 - 33.5 pg    MCHC 31.8 28.4 - 34.8 g/dL    RDW 14.0 11.8 - 14.4 %    Platelets 273 980 - 474 k/uL    MPV 10.3 8.1 - 13.5 fL    NRBC Automated 0.0 0.0 per 100 WBC    Seg Neutrophils 68 (H) 36 - 65 %    Lymphocytes 18 (L) 24 - 43 %    Monocytes 12 3 - 12 %    Eosinophils % 1 1 - 4 %    Basophils 1 0 - 2 %    Immature Granulocytes 0 0 %    Segs Absolute 4.87 1.50 - 8.10 k/uL    Absolute Lymph # 1.31 1.10 - 3.70 k/uL    Absolute Mono # 0.84 0.10 - 1.20 k/uL    Absolute Eos # 0.08 0.00 - 0.44 k/uL    Basophils Absolute 0.06 0.00 - 0.20 k/uL    Absolute Immature Granulocyte <0.03 0.00 - 0.30 k/uL   Urinalysis with Reflex to Culture    Specimen: Urine   Result Value Ref Range    Color, UA Yellow Yellow    Turbidity UA Clear Clear    Glucose, Ur NEGATIVE NEGATIVE    Bilirubin Urine NEGATIVE NEGATIVE    Ketones, Urine TRACE (A) NEGATIVE    Specific Gravity, UA 1.020 1.005 - 1.030    Urine Hgb NEGATIVE NEGATIVE    pH, UA 6.0 5.0 - 8.0    Protein, UA 1+ (A) NEGATIVE    Urobilinogen, Urine Normal Normal    Nitrite, Urine NEGATIVE NEGATIVE    Leukocyte Esterase, Urine MODERATE (A) NEGATIVE   Microscopic Urinalysis   Result Value Ref Range    WBC, UA 50  0 - 5 /HPF    RBC, UA 2 TO 5 0 - 4 /HPF    Casts UA  0 - 8 /LPF     10 TO 20 HYALINE Reference range defined for non-centrifuged specimen.     Epithelial Cells UA 0 TO 2 0 - 5 /HPF   CBC with Auto Differential   Result Value Ref Range    WBC 4.9 3.5 - 11.3 k/uL    RBC 3.80 (L) 3.95 - 5.11 m/uL    Hemoglobin 10.5 (L) 11.9 - 15.1 g/dL    Hematocrit 33.1 (L) 36.3 - 47.1 %    MCV 87.1 82.6 - 102.9 fL    MCH 27.6 25.2 - 33.5 pg    MCHC 31.7 28.4 - 34.8 g/dL    RDW 13.9 11.8 - 14.4 %    Platelets 212 744 - 424 k/uL    MPV 10.5 8.1 - 13.5 fL    NRBC Automated 0.0 0.0 per 100 WBC    Seg Neutrophils 66 (H) 36 - 65 %    Lymphocytes 18 (L) 24 - 43 %    Monocytes 13 (H) 3 - 12 %    Eosinophils % 2 1 - 4 %    Basophils 1 0 - 2 %    Immature Granulocytes 0 0 %    Segs Absolute 3.24 1.50 - 8.10 k/uL    Absolute Lymph # 0.87 (L) 1.10 - 3.70 k/uL    Absolute Mono # 0.65 0.10 - 1.20 k/uL    Absolute Eos # 0.10 0.00 - 0.44 k/uL    Basophils Absolute 0.04 0.00 - 0.20 k/uL    Absolute Immature Granulocyte <0.03 0.00 - 0.30 k/uL   Comprehensive Metabolic Panel w/ Reflex to MG   Result Value Ref Range    Glucose 106 (H) 70 - 99 mg/dL    BUN 9 8 - 23 mg/dL    Creatinine 0.56 0.50 - 0.90 mg/dL    Est, Glom Filt Rate >60 >60 mL/min/1.73m2    Calcium 7.4 (L) 8.6 - 10.4 mg/dL    Sodium 141 135 - 144 mmol/L    Potassium 3.1 (L) 3.7 - 5.3 mmol/L    Chloride 104 98 - 107 mmol/L    CO2 27 20 - 31 mmol/L    Anion Gap 10 9 - 17 mmol/L    Alkaline Phosphatase 80 35 - 104 U/L    ALT 7 5 - 33 U/L    AST 12 <32 U/L    Total Bilirubin 0.2 (L) 0.3 - 1.2 mg/dL    Total Protein 6.3 (L) 6.4 - 8.3 g/dL    Albumin 3.4 (L) 3.5 - 5.2 g/dL    Albumin/Globulin Ratio 1.2 1.0 - 2.5   Magnesium   Result Value Ref Range    Magnesium 1.2 (L) 1.6 - 2.6 mg/dL         IMAGING DATA:    CT ABDOMEN PELVIS WO CONTRAST Additional Contrast? None    Result Date: 3/16/2023  EXAMINATION: CT OF THE ABDOMEN AND PELVIS WITHOUT CONTRAST 3/16/2023 4:34 pm TECHNIQUE: CT of the abdomen and pelvis was performed without the administration of intravenous contrast. Multiplanar reformatted images are provided for review. Automated exposure control, iterative reconstruction, and/or weight based adjustment of the mA/kV was utilized to reduce the radiation dose to as low as reasonably achievable. COMPARISON: None. HISTORY: ORDERING SYSTEM PROVIDED HISTORY: right sided kidney stone TECHNOLOGIST PROVIDED HISTORY: right sided kidney stone Decision Support Exception - unselect if not a suspected or confirmed emergency medical condition->Emergency Medical Condition (MA) Reason for Exam: right sided kidney stone FINDINGS: LOWER CHEST:  Visualized portion of the lower chest demonstrates no acute abnormality.  KIDNEYS AND URINARY TRACT: 3-5 mm nonobstructing right renal stones. . . There is no evidence for hydronephrosis. The ureters are of normal course and caliber. ORGANS: Visualized portions of the liver, spleen, pancreas, gallbladder, and adrenal glands demonstrate no acute abnormality. GI/BOWEL: No bowel obstruction. Appendix is not well visualized in the right lower quadrant area. PELVIS: The bladder and pelvic organs are unremarkable. PERITONEUM/RETROPERITONEUM: No free air. There is a small amount of free fluid through the abdomen and pelvis predominantly surrounding the liver and in the right lower quadrant area and within the pelvis. There is also extensive amount of omental infiltration and thickening which is nonspecific. No pathologically enlarged lymphadenopathy. The vasculature do not demonstrate acute abnormality. BONES/SOFT TISSUES: The osseous structures demonstrate no acute abnormality. No evidence of obstructive uropathy. 3-5 mm nonobstructing right renal stones. There is a small amount of free fluid through the abdomen and pelvis predominantly surrounding the liver and in the right lower quadrant area and within the pelvis. The etiology of free fluid within the abdomen and pelvis is unclear. No discrete signs of cirrhosis is noted. There is also extensive amount of omental infiltration and thickening which is nonspecific. Although the finding can be seen in setting of fluid within peritoneal cavity, it can also be suggestive of intraperitoneal tumor infiltration. For further evaluation contrast enhanced CT of the abdomen and pelvis can be obtained. Appendix is not well visualized in the right lower quadrant area. Considering moderate amount of inflammatory change and fluid in the right lower quadrant area, there is need for further evaluation with oral and IV contrast to exclude acute appendicitis.      CT ABDOMEN PELVIS W IV CONTRAST Additional Contrast? None    Result Date: 3/16/2023  EXAMINATION: CT OF THE ABDOMEN AND PELVIS WITH CONTRAST 3/16/2023 6:59 pm TECHNIQUE: CT of the abdomen and pelvis was performed with the administration of intravenous contrast. Multiplanar reformatted images are provided for review. Automated exposure control, iterative reconstruction, and/or weight based adjustment of the mA/kV was utilized to reduce the radiation dose to as low as reasonably achievable. COMPARISON: Noncontrast CT today. HISTORY: ORDERING SYSTEM PROVIDED HISTORY: right flank pain, ct wo recommending contrast study TECHNOLOGIST PROVIDED HISTORY: right flank pain, ct wo recommending contrast study Decision Support Exception - unselect if not a suspected or confirmed emergency medical condition->Emergency Medical Condition (MA) FINDINGS: Lower Chest: No acute findings. Organs: No morphologic evidence for cirrhosis. Tiny hypoattenuating liver lesion in segment 8/5 is favored to represent a cyst.  There is probable fat deposition near the gallbladder fossa. The hepatic and portal veins are appropriately opacified. The gallbladder, pancreas, spleen and kidneys reveal no acute findings. Punctate stone in the superior pole the right kidney again noted. 1.7 cm right adrenal nodule head findings compatible with a benign adenoma on the noncontrast exam.  Left adrenal gland appears within normal limits. GI/Bowel: No evidence for bowel obstruction. Mild liquid stool in the proximal colon. The appendix is not identified. Pelvis: The bladder is well distended. No focal abnormality identified. Peritoneum/Retroperitoneum: Small volume abdominopelvic ascites is noted. Induration of the omentum is noted, greater than expected for the degree of underlying ascites. No loculated fluid collection identified. No enlarged or suspicious-appearing lymph nodes. The aorta is normal in kit caliber. Scattered atheromatous plaque is noted. The visceral branches are patent.  Bones/Soft Tissues: Mild induration of the periumbilical region and small fat containing umbilical hernia. No acute osseous abnormality identified. 1.  Redemonstration of small volume abdominopelvic ascites and induration of the omentum. No morphologic evidence for cirrhosis on this exam.  Unless there is a known clinical explanation for these findings, peritoneal carcinomatosis remains the diagnosis of exclusion. 2.  The appendix is not identified. No discrete bowel wall thickening identified. Liquid stool is present in the colon consistent with diarrheal process. 3.  Punctate nonobstructing right renal stone. 4.  Mild induration of the periumbilical fat and small fat containing hernia. 5.  Right adrenal nodule, consistent with a benign adenoma on the noncontrast exam.         IMPRESSION:   Primary Problem  Abdominal pain    Active Hospital Problems    Diagnosis Date Noted    Abdominal pain [R10.9] 03/16/2023     Priority: Medium       Lung cancer  Omental induration  Ascites    RECOMMENDATIONS:  I reviewed the labs/imaging available to me,outside records and discussed with the patient. I explained to the patient the nature of this problem. I explained the significance of these abnormalities and possible etiology and management options  Reviewed records from outside facility. Reviewed hospitalization course  Discussed results of CT scans  Personally reviewed results and imaging studies  Patient CT scan of chest from February showed response to therapy  Patient does seem to have concerning lesion possibly carcinomatosis however I am not convinced it explains patient's ongoing symptoms of progressive pain. Recommend ruling out other etiologies including infection. I will obtain inflammatory markers on the patient. Still concern regarding carcinomatosis we can get outpatient CT PET. Patient OARRS report reviewed patient last prescription for oxycodone was filled in December for 12 tablets only.   Patient will continue to follow-up with primary oncologist continue Keytruda. We we will sign off and will be available if there are any questions        Discussed with patient and Nurse. Thank you for asking us to see this patient. Alfred Seaman MD          This note is created with the assistance of a speech recognition program.  While intending to generate a document that actually reflects the content of the visit, the document can still have some errors including those of syntax and sound a like substitutions which may escape proof reading. It such instances, actual meaning can be extrapolated by contextual diversion.

## 2023-03-17 NOTE — H&P
[address]  CDU / OBSERVATION ENCOUNTER      Pt Name: Suyapa Melchor  MRN: [id number]  [place] 1955  Date of evaluation: 3/17/23  Patient's PCP is :  Beatrice Vergara MD    CHIEF COMPLAINT       Chief Complaint   Patient presents with    Flank Pain     Right x 3 weeks         HISTORY OF PRESENT ILLNESS    Suyapa Melchor is a 74 y.o. female w/ PMHx of COPD on 2 L Home O2, arthritis, blood clots, lung cancer, T2DM, HTN, who presents w/ 1 week of R sided abdominal and flank pain. UA concerning for UTI, culture pending. No leukocytosis, hypokalemia on admission. CT A/P obtained in ED with concern for carcinomatosis given patients known cancer diagnosis, diarrheal illness, and punctate non obstructing R renal stone. Appendix not identified. Repeat labs this AM stable, continued hypokalemia and hypomagnesemia. Per chart review patient has had repeated UTIs, was treated for persistent cystitis on 3/7 at [facility] and seen for R sided abdominal and flank pain at [facility] back in February 2023. Since admission patient remains afebrile, VSS. Location/Symptom: Abdominal pain  Timing/Onset: Days  Provocation: Uncertain  Quality: Right-sided and crampy. Radiation: None  Severity: Moderate. Improving  Timing/Duration: Days  Modifying Factors: Unclear    History was obtained in part through review of the ED chart.  When possible, a direct discussion was had with ED nurses, residents, and attendings  REVIEW OF SYSTEMS        General ROS - No fevers, No malaise   Ophthalmic ROS - No discharge, No changes in vision  ENT ROS -  No sore throat, No rhinorrhea,   Respiratory ROS - no shortness of breath, no cough, no  wheezing  Cardiovascular ROS - No chest pain, no dyspnea on exertion  Gastrointestinal ROS - abdominal pain, nausea, vomiting, no change in bowel habits, no black or bloody stools  Genito-Urinary ROS - No dysuria, trouble voiding, or hematuria  Musculoskeletal ROS - No myalgias, No arthalgias  Neurological ROS - No headache, no dizziness/lightheadedness, No focal weakness, no loss of sensation  Dermatological ROS - No lesions, No rash     (PQRS) Advance directives on face sheet per hospital policy. No change unless specifically mentioned in chart    PAST MEDICAL HISTORY    has a past medical history of Anxiety, Arthritis, COPD (chronic obstructive pulmonary disease) ([address] and Hx of blood clots. I have reviewed the past medical history with the patient and it is pertinent to this complaint. SURGICAL HISTORY      has a past surgical history that includes Breast surgery; Finger trigger release; Knee cartilage surgery (Bilateral); Hysterectomy; and toenail excision (Bilateral). I have reviewed and agree with Surgical History entered and it is pertinent to this complaint. CURRENT MEDICATIONS     ibuprofen (ADVIL;MOTRIN) tablet 400 mg, Q6H PRN  cefTRIAXone (ROCEPHIN) 1,000 mg in sterile water 10 mL IV syringe, Q24H  potassium bicarb-citric acid (EFFER-K) effervescent tablet 20 mEq, Daily  oxyCODONE (ROXICODONE) immediate release tablet 5 mg, Q4H PRN  fentaNYL (SUBLIMAZE) injection 25 mcg, Q2H PRN  0.9 % sodium chloride infusion, Continuous  sodium chloride flush 0.9 % injection 5-40 mL, 2 times per day  sodium chloride flush 0.9 % injection 5-40 mL, PRN  0.9 % sodium chloride infusion, PRN  potassium chloride (KLOR-CON M) extended release tablet 40 mEq, PRN   Or  potassium bicarb-citric acid (EFFER-K) effervescent tablet 40 mEq, PRN   Or  potassium chloride 10 mEq/100 mL IVPB (Peripheral Line), PRN  enoxaparin (LOVENOX) injection 40 mg, Daily  ondansetron (ZOFRAN) injection 4 mg, Q4H PRN  polyethylene glycol (GLYCOLAX) packet 17 g, Daily PRN  acetaminophen (TYLENOL) tablet 650 mg, Q6H PRN   Or  acetaminophen (TYLENOL) suppository 650 mg, Q6H PRN      All medication charted and reviewed.     ALLERGIES     is allergic to morphine, baclofen, fluoxetine, gabapentin, levofloxacin, loratadine, moxifloxacin, nitrofurantoin macrocrystal, olanzapine, pcn [penicillins], and tramadol. FAMILY HISTORY     has no family status information on file. family history is not on file. The patient denies any pertinent family history. I have reviewed and agree with the family history entered. I have reviewed the Family History and it is not significant to the case    SOCIAL HISTORY      reports that she has been smoking cigarettes. She started smoking about 52 years ago. She has a 50.00 pack-year smoking history. She has never used smokeless tobacco.  I have reviewed and agree with all Social.  There are no concerns for substance abuse/use. PHYSICAL EXAM     INITIAL VITALS:  height is 5' 7\" (1.702 m) and weight is 171 lb 4.8 oz (77.7 kg). Her oral temperature is 97.8 °F (36.6 °C). Her blood pressure is 113/52 (abnormal) and her pulse is 72. Her respiration is 16 and oxygen saturation is 97%. CONSTITUTIONAL: AOx4, no apparent distress, appears stated age patient appears uncomfortable   HEAD: normocephalic, atraumatic   EYES: PERRLA, EOMI    ENT: moist mucous membranes, uvula midline   NECK: supple, symmetric   BACK: symmetric   LUNGS: clear to auscultation bilaterally   CARDIOVASCULAR: regular rate and rhythm, no murmurs, rubs or gallops   ABDOMEN: soft, non-tender, non-distended with normal active bowel sounds   NEUROLOGIC:  MAEx4, no focal sensory or motor deficits   MUSCULOSKELETAL: no clubbing, cyanosis or edema   SKIN: no rash or wounds       DIFFERENTIAL DIAGNOSIS/MDM:     FROM ED MEDICAL DECISION MAKING NOTE:   Court Griffin is a 74 y.o. female who presents with right-sided abdominal pain and right-sided flank pain. It has been progressively worsening over 3 weeks history of lung cancer, history of COPD, history of type 2 diabetes and hypertension. She wears 2 L nasal cannula at home not requiring anything more today.   She has no shortness of breath or chest pain on review of systems. She denies vomiting but reports nausea. She is actually requesting for food in the room at this time. She rates her pain as 7 out of 10 starts in her lower abdomen and radiates to her right flank. On exam she has mild tenderness in the right flank and right lower quadrant of the abdomen. Her belly soft, no peritoneal signs. Urinalysis concerning for urinary tract infection, will start on Keflex. Labs, urinalysis and CT abdomen without contrast stone protocol was ordered. There is no evidence of nephrolithiasis on CT scan however there was free fluid and concern for possible appendicitis without contrast use, radiology recommending a CT scan with IV contrast.  Pain medication provided. Patient signed out to Dr. Frank Guajardo pending CT with contrast.     DIAGNOSTIC RESULTS       RADIOLOGY:   I directly visualized the following  images and reviewed the radiologist interpretations:    CT ABDOMEN PELVIS WO CONTRAST Additional Contrast? None    Result Date: 3/16/2023  EXAMINATION: CT OF THE ABDOMEN AND PELVIS WITHOUT CONTRAST 3/16/2023 4:34 pm TECHNIQUE: CT of the abdomen and pelvis was performed without the administration of intravenous contrast. Multiplanar reformatted images are provided for review. Automated exposure control, iterative reconstruction, and/or weight based adjustment of the mA/kV was utilized to reduce the radiation dose to as low as reasonably achievable. COMPARISON: None. HISTORY: ORDERING SYSTEM PROVIDED HISTORY: right sided kidney stone TECHNOLOGIST PROVIDED HISTORY: right sided kidney stone Decision Support Exception - unselect if not a suspected or confirmed emergency medical condition->Emergency Medical Condition (MA) Reason for Exam: right sided kidney stone FINDINGS: LOWER CHEST:  Visualized portion of the lower chest demonstrates no acute abnormality. KIDNEYS AND URINARY TRACT: 3-5 mm nonobstructing right renal stones. . . There is no evidence for hydronephrosis.   The ureters are of normal course and caliber. ORGANS: Visualized portions of the liver, spleen, pancreas, gallbladder, and adrenal glands demonstrate no acute abnormality. GI/BOWEL: No bowel obstruction. Appendix is not well visualized in the right lower quadrant area. PELVIS: The bladder and pelvic organs are unremarkable. PERITONEUM/RETROPERITONEUM: No free air. There is a small amount of free fluid through the abdomen and pelvis predominantly surrounding the liver and in the right lower quadrant area and within the pelvis. There is also extensive amount of omental infiltration and thickening which is nonspecific. No pathologically enlarged lymphadenopathy. The vasculature do not demonstrate acute abnormality. BONES/SOFT TISSUES: The osseous structures demonstrate no acute abnormality. No evidence of obstructive uropathy. 3-5 mm nonobstructing right renal stones. There is a small amount of free fluid through the abdomen and pelvis predominantly surrounding the liver and in the right lower quadrant area and within the pelvis. The etiology of free fluid within the abdomen and pelvis is unclear. No discrete signs of cirrhosis is noted. There is also extensive amount of omental infiltration and thickening which is nonspecific. Although the finding can be seen in setting of fluid within peritoneal cavity, it can also be suggestive of intraperitoneal tumor infiltration. For further evaluation contrast enhanced CT of the abdomen and pelvis can be obtained. Appendix is not well visualized in the right lower quadrant area. Considering moderate amount of inflammatory change and fluid in the right lower quadrant area, there is need for further evaluation with oral and IV contrast to exclude acute appendicitis.      CT ABDOMEN PELVIS W IV CONTRAST Additional Contrast? None    Result Date: 3/16/2023  EXAMINATION: CT OF THE ABDOMEN AND PELVIS WITH CONTRAST 3/16/2023 6:59 pm TECHNIQUE: CT of the abdomen and pelvis was performed with the administration of intravenous contrast. Multiplanar reformatted images are provided for review. Automated exposure control, iterative reconstruction, and/or weight based adjustment of the mA/kV was utilized to reduce the radiation dose to as low as reasonably achievable. COMPARISON: Noncontrast CT today. HISTORY: ORDERING SYSTEM PROVIDED HISTORY: right flank pain, ct wo recommending contrast study TECHNOLOGIST PROVIDED HISTORY: right flank pain, ct wo recommending contrast study Decision Support Exception - unselect if not a suspected or confirmed emergency medical condition->Emergency Medical Condition (MA) FINDINGS: Lower Chest: No acute findings. Organs: No morphologic evidence for cirrhosis. Tiny hypoattenuating liver lesion in segment 8/5 is favored to represent a cyst.  There is probable fat deposition near the gallbladder fossa. The hepatic and portal veins are appropriately opacified. The gallbladder, pancreas, spleen and kidneys reveal no acute findings. Punctate stone in the superior pole the right kidney again noted. 1.7 cm right adrenal nodule head findings compatible with a benign adenoma on the noncontrast exam.  Left adrenal gland appears within normal limits. GI/Bowel: No evidence for bowel obstruction. Mild liquid stool in the proximal colon. The appendix is not identified. Pelvis: The bladder is well distended. No focal abnormality identified. Peritoneum/Retroperitoneum: Small volume abdominopelvic ascites is noted. Induration of the omentum is noted, greater than expected for the degree of underlying ascites. No loculated fluid collection identified. No enlarged or suspicious-appearing lymph nodes. The aorta is normal in kit caliber. Scattered atheromatous plaque is noted. The visceral branches are patent. Bones/Soft Tissues: Mild induration of the periumbilical region and small fat containing umbilical hernia. No acute osseous abnormality identified.      1. Redemonstration of small volume abdominopelvic ascites and induration of the omentum. No morphologic evidence for cirrhosis on this exam.  Unless there is a known clinical explanation for these findings, peritoneal carcinomatosis remains the diagnosis of exclusion. 2.  The appendix is not identified. No discrete bowel wall thickening identified. Liquid stool is present in the colon consistent with diarrheal process. 3.  Punctate nonobstructing right renal stone. 4.  Mild induration of the periumbilical fat and small fat containing hernia. 5.  Right adrenal nodule, consistent with a benign adenoma on the noncontrast exam.       LABS:  I have reviewed and interpreted all available lab results.   Labs Reviewed   BASIC METABOLIC PANEL - Abnormal; Notable for the following components:       Result Value    Calcium 8.5 (*)     Potassium 3.0 (*)     All other components within normal limits   CBC WITH AUTO DIFFERENTIAL - Abnormal; Notable for the following components:    Seg Neutrophils 68 (*)     Lymphocytes 18 (*)     All other components within normal limits   URINALYSIS WITH REFLEX TO CULTURE - Abnormal; Notable for the following components:    Ketones, Urine TRACE (*)     Protein, UA 1+ (*)     Leukocyte Esterase, Urine MODERATE (*)     All other components within normal limits   CBC WITH AUTO DIFFERENTIAL - Abnormal; Notable for the following components:    RBC 3.80 (*)     Hemoglobin 10.5 (*)     Hematocrit 33.1 (*)     Seg Neutrophils 66 (*)     Lymphocytes 18 (*)     Monocytes 13 (*)     Absolute Lymph # 0.87 (*)     All other components within normal limits   COMPREHENSIVE METABOLIC PANEL W/ REFLEX TO MG FOR LOW K - Abnormal; Notable for the following components:    Glucose 106 (*)     Calcium 7.4 (*)     Potassium 3.1 (*)     Total Bilirubin 0.2 (*)     Total Protein 6.3 (*)     Albumin 3.4 (*)     All other components within normal limits   MAGNESIUM - Abnormal; Notable for the following components: Magnesium 1.2 (*)     All other components within normal limits   CULTURE, URINE   MICROSCOPIC URINALYSIS   C-REACTIVE PROTEIN   SEDIMENTATION RATE         CDU IMPRESSION / Reinaldo Rehman is a 74 y.o. female who presents with R sided abdominal and flank pain. Complicated UTI:  - urine culture pending  - received 1 dose keflex in ED. Will start patient on rocephin while inpatient given recurrent cystitis. 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 - hem/onc consulted, appreciate recs     Hypokalemia:  - K 3.1, will give PO replacement       Continue home medications and pain control  Monitor vitals, labs, and imaging  DISPO: pending consults and clinical improvement    CONSULTS:    IP CONSULT TO HEM/ONC    PROCEDURES:  Not indicated       PATIENT REFERRED TO:    No follow-up provider specified. --  Angelique Campos MD   Emergency Medicine     This dictation was generated by voice recognition computer software. Although all attempts are made to edit the dictation for accuracy, there may be errors in the transcription that are not intended.

## 2023-03-17 NOTE — ED PROVIDER NOTES
[facility] [address] [facility]  Emergency Department  Emergency Medicine Resident Sign-out     Care of Vale Robertson was assumed from Dr. Bisi Villanueva and is being seen for Flank Pain (Right x 3 weeks)  . The patient's initial evaluation and plan have been discussed with the prior provider who initially evaluated the patient.      EMERGENCY DEPARTMENT COURSE / MEDICAL DECISION MAKING:       MEDICATIONS GIVEN:  Orders Placed This Encounter   Medications    ketorolac (TORADOL) injection 15 mg    0.9 % sodium chloride bolus    morphine injection 4 mg    potassium bicarb-citric acid (EFFER-K) effervescent tablet 40 mEq    cephALEXin (KEFLEX) capsule 500 mg     Order Specific Question:   Antimicrobial Indications     Answer:   Urinary Tract Infection    iopamidol (ISOVUE-370) 76 % injection 75 mL    ondansetron (ZOFRAN) injection 4 mg    morphine injection 4 mg    0.9 % sodium chloride infusion    sodium chloride flush 0.9 % injection 5-40 mL    sodium chloride flush 0.9 % injection 5-40 mL    0.9 % sodium chloride infusion    OR Linked Order Group     potassium chloride (KLOR-CON M) extended release tablet 40 mEq     potassium bicarb-citric acid (EFFER-K) effervescent tablet 40 mEq     potassium chloride 10 mEq/100 mL IVPB (Peripheral Line)    enoxaparin (LOVENOX) injection 40 mg     Order Specific Question:   Indication of Use     Answer:   Prophylaxis-DVT/PE    ondansetron (ZOFRAN) injection 4 mg    polyethylene glycol (GLYCOLAX) packet 17 g    OR Linked Order Group     acetaminophen (TYLENOL) tablet 650 mg     acetaminophen (TYLENOL) suppository 650 mg       LABS / RADIOLOGY:     Labs Reviewed   BASIC METABOLIC PANEL - Abnormal; Notable for the following components:       Result Value    Calcium 8.5 (*)     Potassium 3.0 (*)     All other components within normal limits   CBC WITH AUTO DIFFERENTIAL - Abnormal; Notable for the following components:    Seg Neutrophils 68 (*)     Lymphocytes 18 (*)     All other components within normal limits   URINALYSIS WITH REFLEX TO CULTURE - Abnormal; Notable for the following components:    Ketones, Urine TRACE (*)     Protein, UA 1+ (*)     Leukocyte Esterase, Urine MODERATE (*)     All other components within normal limits   CULTURE, URINE   MICROSCOPIC URINALYSIS   COMPREHENSIVE METABOLIC PANEL W/ REFLEX TO MG FOR LOW K   CBC WITH AUTO DIFFERENTIAL       CT ABDOMEN PELVIS WO CONTRAST Additional Contrast? None    Result Date: 3/16/2023  EXAMINATION: CT OF THE ABDOMEN AND PELVIS WITHOUT CONTRAST 3/16/2023 4:34 pm TECHNIQUE: CT of the abdomen and pelvis was performed without the administration of intravenous contrast. Multiplanar reformatted images are provided for review. Automated exposure control, iterative reconstruction, and/or weight based adjustment of the mA/kV was utilized to reduce the radiation dose to as low as reasonably achievable. COMPARISON: None. HISTORY: ORDERING SYSTEM PROVIDED HISTORY: right sided kidney stone TECHNOLOGIST PROVIDED HISTORY: right sided kidney stone Decision Support Exception - unselect if not a suspected or confirmed emergency medical condition->Emergency Medical Condition (MA) Reason for Exam: right sided kidney stone FINDINGS: LOWER CHEST:  Visualized portion of the lower chest demonstrates no acute abnormality. KIDNEYS AND URINARY TRACT: 3-5 mm nonobstructing right renal stones. . . There is no evidence for hydronephrosis. The ureters are of normal course and caliber. ORGANS: Visualized portions of the liver, spleen, pancreas, gallbladder, and adrenal glands demonstrate no acute abnormality. GI/BOWEL: No bowel obstruction. Appendix is not well visualized in the right lower quadrant area. PELVIS: The bladder and pelvic organs are unremarkable. PERITONEUM/RETROPERITONEUM: No free air. There is a small amount of free fluid through the abdomen and pelvis predominantly surrounding the liver and in the right lower quadrant area and within the pelvis. There is also extensive amount of omental infiltration and thickening which is nonspecific. No pathologically enlarged lymphadenopathy. The vasculature do not demonstrate acute abnormality. BONES/SOFT TISSUES: The osseous structures demonstrate no acute abnormality. No evidence of obstructive uropathy. 3-5 mm nonobstructing right renal stones. There is a small amount of free fluid through the abdomen and pelvis predominantly surrounding the liver and in the right lower quadrant area and within the pelvis. The etiology of free fluid within the abdomen and pelvis is unclear. No discrete signs of cirrhosis is noted. There is also extensive amount of omental infiltration and thickening which is nonspecific. Although the finding can be seen in setting of fluid within peritoneal cavity, it can also be suggestive of intraperitoneal tumor infiltration. For further evaluation contrast enhanced CT of the abdomen and pelvis can be obtained. Appendix is not well visualized in the right lower quadrant area. Considering moderate amount of inflammatory change and fluid in the right lower quadrant area, there is need for further evaluation with oral and IV contrast to exclude acute appendicitis. RECENT VITALS:     Temp: 97 °F (36.1 °C),  Heart Rate: 82, Resp: 16, BP: 112/71, SpO2: 96 %    This patient is a 74 y.o. Female with right lower quadrant and right flank pain 3 weeks, feeling like abdomen is getting more full progressively - and therefore progressively getting worse. imaging done to rule out pyelonephritis nephrolithiasis and appendicitis. She failed p.o. challenge got IV contrast required more Zofran. She has known cancer and is undergoing treatment - concern for mets to omentum on scans. OUTSTANDING TASKS / RECOMMENDATIONS:    CT scanner IV contrast appendicitis if negative diagnosis for severe UTI go home on antibiotics   UTI - Already on Keflex     FINAL IMPRESSION:     1.  Right lower quadrant abdominal pain    2. Flank pain        DISPOSITION:         DISPOSITION:  []  Discharge   []  Transfer -    [x]  Admission -  to observation unit - as patient continued to require pain med , failing PO challenge   []  Against Medical Advice   []  Eloped   FOLLOW-UP: No follow-up provider specified.    DISCHARGE MEDICATIONS: New Prescriptions    No medications on file           Angelica Owens MD  Emergency Medicine Resident  [facility]      Angelica Owens MD  Resident  03/16/23 [id number]

## 2023-03-17 NOTE — CARE COORDINATION
Case Management Assessment  Initial Evaluation    Date/Time of Evaluation: 3/17/2023 8:58 AM  Assessment Completed by: José Antonio Lambert RN    If patient is discharged prior to next notation, then this note serves as note for discharge by case management. Patient Name: Julieta Kinney                   YOB: 1955  Diagnosis: Abdominal pain [R10.9]  Flank pain [R10.9]  Right lower quadrant abdominal pain [R10.31]                   Date / Time: 3/16/2023  3:43 PM    Patient Admission Status: Inpatient   Readmission Risk (Low < 19, Mod (19-27), High > 27): Readmission Risk Score: 9.3    Current PCP: Xavier Hernandez MD  PCP verified by CM? (P) Yes (Dr. Xavier Hernandez)    Chart Reviewed: Yes      History Provided by: (P) Patient  Patient Orientation: (P) Alert and Oriented, Person, Situation, Place    Patient Cognition: (P) Alert    Hospitalization in the last 30 days (Readmission):  No    If yes, Readmission Assessment in  Navigator will be completed.     Advance Directives:      Code Status: Full Code   Patient's Primary Decision Maker is: (P) Legal Next of Kin      Discharge Planning:    Patient lives with: (P) Alone Type of Home: (P) House  Primary Care Giver: (P) Self  Patient Support Systems include: (P) Children   Current Financial resources: (P) Medicaid  Current community resources:    Current services prior to admission: (P) Oxygen Therapy            Current DME:              Type of Home Care services:  (P) None    ADLS  Prior functional level: (P) Independent in ADLs/IADLs  Current functional level: (P) Independent in ADLs/IADLs    PT AM-PAC:   /24  OT AM-PAC:   /24    Family can provide assistance at DC: (P) No  Would you like Case Management to discuss the discharge plan with any other family members/significant others, and if so, who? (P) No  Plans to Return to Present Housing: (P) Yes  Other Identified Issues/Barriers to RETURNING to current housing: na   Potential Assistance needed at discharge: (P) Transportation            Potential DME:    Patient expects to discharge to: (P) [facility] for transportation at discharge: (P) [address]: Nam / Plan: Nam / Product Type: *No Product type* /     Does insurance require precert for SNF: Yes    Potential assistance Purchasing Medications: (P) No  Meds-to-Beds request:        [facility] - [place], [address] [facility] [address] [address]  Phone: [phone] Fax: [phone]    [facility] [address] [address]  Phone: [phone] Fax: [phone]      Notes:    Factors facilitating achievement of predicted outcomes: Has needed Durable Medical Equipment at home    Barriers to discharge: Pain    Additional Case Management Notes: Spoke with patient at bedside, role explained. Plan to return home independently. Needs transportation. Has home oxygen. Attempting to get [facility]. Address, PCP and insurance reviewed. Respiratory notified of home oxygen. The Plan for Transition of Care is related to the following treatment goals of Abdominal pain [R10.9]  Flank pain [R10.9]  Right lower quadrant abdominal pain [G06.18]    IF APPLICABLE: The Patient and/or patient representative [facility] and her family were provided with a choice of provider and agrees with the discharge plan. Freedom of choice list with basic dialogue that supports the patient's individualized plan of care/goals and shares the quality data associated with the providers was provided to: (P) Patient   Patient Representative Name:       The Patient and/or Patient Representative Agree with the Discharge Plan?       Nyla Jones RN  Case Management Department  Ph: [phone]

## 2023-03-17 NOTE — ED NOTES
Report given to [facility] on 3B. Pt to go up to floor when bed is clean.       Valentino Bouchard, RN  03/16/23 3541

## 2023-03-18 NOTE — PLAN OF CARE
Problem: Discharge Planning  Goal: Discharge to home or other facility with appropriate resources  3/17/2023 2050 by Irene Tan RN  Outcome: Progressing  3/17/2023 1710 by Monty Beebe RN  Outcome: Progressing  Flowsheets (Taken 3/17/2023 1405)  Discharge to home or other facility with appropriate resources:   Identify barriers to discharge with patient and caregiver   Arrange for needed discharge resources and transportation as appropriate   Identify discharge learning needs (meds, wound care, etc)   Refer to discharge planning if patient needs post-hospital services based on physician order or complex needs related to functional status, cognitive ability or social support system     Problem: Pain  Goal: Verbalizes/displays adequate comfort level or baseline comfort level  3/17/2023 2050 by Irene Tan RN  Outcome: Progressing  3/17/2023 1710 by Monty Beebe RN  Outcome: Progressing  Flowsheets (Taken 3/17/2023 1405)  Verbalizes/displays adequate comfort level or baseline comfort level:   Encourage patient to monitor pain and request assistance   Assess pain using appropriate pain scale   Administer analgesics based on type and severity of pain and evaluate response     Problem: ABCDS Injury Assessment  Goal: Absence of physical injury  3/17/2023 2050 by Irene Tan RN  Outcome: Progressing  3/17/2023 1710 by Monty Beebe RN  Outcome: Progressing     Problem: Chronic Conditions and Co-morbidities  Goal: Patient's chronic conditions and co-morbidity symptoms are monitored and maintained or improved  3/17/2023 2050 by Irene Tan RN  Outcome: Progressing  3/17/2023 1710 by Monty Beebe RN  Outcome: Progressing  Flowsheets (Taken 3/17/2023 1405)  Care Plan - Patient's Chronic Conditions and Co-Morbidity Symptoms are Monitored and Maintained or Improved:   Monitor and assess patient's chronic conditions and comorbid symptoms for stability, deterioration, or improvement Collaborate with multidisciplinary team to address chronic and comorbid conditions and prevent exacerbation or deterioration   Update acute care plan with appropriate goals if chronic or comorbid symptoms are exacerbated and prevent overall improvement and discharge

## 2023-03-18 NOTE — PLAN OF CARE
Problem: Discharge Planning  Goal: Discharge to home or other facility with appropriate resources  3/17/2023 2050 by Marquita Pulido RN  Outcome: Completed  3/17/2023 2050 by Marquita Pulido RN  Outcome: Progressing  3/17/2023 1710 by Lillie Childs RN  Outcome: Progressing  Flowsheets (Taken 3/17/2023 1405)  Discharge to home or other facility with appropriate resources:   Identify barriers to discharge with patient and caregiver   Arrange for needed discharge resources and transportation as appropriate   Identify discharge learning needs (meds, wound care, etc)   Refer to discharge planning if patient needs post-hospital services based on physician order or complex needs related to functional status, cognitive ability or social support system     Problem: Pain  Goal: Verbalizes/displays adequate comfort level or baseline comfort level  3/17/2023 2050 by Marquita Pulido RN  Outcome: Completed  3/17/2023 2050 by Marquita Pulido RN  Outcome: Progressing  3/17/2023 1710 by Lillie Childs RN  Outcome: Progressing  Flowsheets (Taken 3/17/2023 1405)  Verbalizes/displays adequate comfort level or baseline comfort level:   Encourage patient to monitor pain and request assistance   Assess pain using appropriate pain scale   Administer analgesics based on type and severity of pain and evaluate response     Problem: ABCDS Injury Assessment  Goal: Absence of physical injury  3/17/2023 2050 by Marquita Pulido RN  Outcome: Completed  3/17/2023 2050 by Marquita Pulido RN  Outcome: Progressing  3/17/2023 1710 by Lillie Childs RN  Outcome: Progressing     Problem: Chronic Conditions and Co-morbidities  Goal: Patient's chronic conditions and co-morbidity symptoms are monitored and maintained or improved  3/17/2023 2050 by Marquita Pulido RN  Outcome: Completed  3/17/2023 2050 by Marquita Pulido RN  Outcome: Progressing  3/17/2023 1710 by Lillie Childs RN  Outcome: Progressing  Flowsheets (Taken 3/17/2023 1405)  Care Plan - Patient's Chronic Conditions and Co-Morbidity Symptoms are Monitored and Maintained or Improved:   Monitor and assess patient's chronic conditions and comorbid symptoms for stability, deterioration, or improvement   Collaborate with multidisciplinary team to address chronic and comorbid conditions and prevent exacerbation or deterioration   Update acute care plan with appropriate goals if chronic or comorbid symptoms are exacerbated and prevent overall improvement and discharge

## 2023-05-01 ENCOUNTER — APPOINTMENT (OUTPATIENT)
Dept: GENERAL RADIOLOGY | Age: 68
DRG: 246 | End: 2023-05-01
Payer: MEDICAID

## 2023-05-01 ENCOUNTER — OFFICE VISIT (OUTPATIENT)
Dept: GYNECOLOGIC ONCOLOGY | Age: 68
End: 2023-05-01
Payer: MEDICAID

## 2023-05-01 ENCOUNTER — APPOINTMENT (OUTPATIENT)
Dept: CT IMAGING | Age: 68
DRG: 246 | End: 2023-05-01
Payer: MEDICAID

## 2023-05-01 ENCOUNTER — HOSPITAL ENCOUNTER (INPATIENT)
Age: 68
LOS: 3 days | Discharge: HOME HEALTH | DRG: 246 | End: 2023-05-04
Attending: EMERGENCY MEDICINE | Admitting: FAMILY MEDICINE
Payer: MEDICAID

## 2023-05-01 VITALS
HEART RATE: 91 BPM | SYSTOLIC BLOOD PRESSURE: 137 MMHG | TEMPERATURE: 97.2 F | BODY MASS INDEX: 24.14 KG/M2 | HEIGHT: 67 IN | DIASTOLIC BLOOD PRESSURE: 68 MMHG | WEIGHT: 153.8 LBS | OXYGEN SATURATION: 97 %

## 2023-05-01 DIAGNOSIS — E87.6 HYPOKALEMIA: ICD-10-CM

## 2023-05-01 DIAGNOSIS — E46 MALNUTRITION, UNSPECIFIED TYPE (HCC): ICD-10-CM

## 2023-05-01 DIAGNOSIS — C78.6 PERITONEAL CARCINOMATOSIS (HCC): ICD-10-CM

## 2023-05-01 DIAGNOSIS — C56.9 MALIGNANT NEOPLASM OF OVARY, UNSPECIFIED LATERALITY (HCC): ICD-10-CM

## 2023-05-01 DIAGNOSIS — Z51.5 ENCOUNTER FOR PALLIATIVE CARE: ICD-10-CM

## 2023-05-01 DIAGNOSIS — R10.32 LEFT LOWER QUADRANT ABDOMINAL PAIN: ICD-10-CM

## 2023-05-01 DIAGNOSIS — C57.9 MALIGNANT NEOPLASM OF FEMALE GENITAL ORGAN (HCC): Primary | ICD-10-CM

## 2023-05-01 DIAGNOSIS — E83.42 HYPOMAGNESEMIA: Primary | ICD-10-CM

## 2023-05-01 DIAGNOSIS — R42 DIZZINESS: ICD-10-CM

## 2023-05-01 DIAGNOSIS — E83.51 HYPOCALCEMIA: ICD-10-CM

## 2023-05-01 DIAGNOSIS — R11.2 NAUSEA AND VOMITING, UNSPECIFIED VOMITING TYPE: ICD-10-CM

## 2023-05-01 DIAGNOSIS — K52.9 COLITIS: ICD-10-CM

## 2023-05-01 DIAGNOSIS — R19.7 DIARRHEA, UNSPECIFIED TYPE: ICD-10-CM

## 2023-05-01 LAB
ABSOLUTE EOS #: 0.05 K/UL (ref 0–0.44)
ABSOLUTE IMMATURE GRANULOCYTE: 0.03 K/UL (ref 0–0.3)
ABSOLUTE LYMPH #: 0.98 K/UL (ref 1.1–3.7)
ABSOLUTE MONO #: 0.63 K/UL (ref 0.1–1.2)
ALBUMIN SERPL-MCNC: 3.7 G/DL (ref 3.5–5.2)
ALBUMIN/GLOBULIN RATIO: 1 (ref 1–2.5)
ALP SERPL-CCNC: 101 U/L (ref 35–104)
ALT SERPL-CCNC: 7 U/L (ref 5–33)
ANION GAP SERPL CALCULATED.3IONS-SCNC: 14 MMOL/L (ref 9–17)
AST SERPL-CCNC: 12 U/L
BACTERIA: ABNORMAL
BASOPHILS # BLD: 1 % (ref 0–2)
BASOPHILS ABSOLUTE: 0.04 K/UL (ref 0–0.2)
BILIRUB SERPL-MCNC: 0.4 MG/DL (ref 0.3–1.2)
BILIRUBIN URINE: NEGATIVE
BUN SERPL-MCNC: 9 MG/DL (ref 8–23)
CA-I BLD-SCNC: 0.92 MMOL/L (ref 1.13–1.33)
CALCIUM SERPL-MCNC: 8 MG/DL (ref 8.6–10.4)
CASTS UA: ABNORMAL /LPF (ref 0–8)
CHLORIDE SERPL-SCNC: 97 MMOL/L (ref 98–107)
CO2 SERPL-SCNC: 25 MMOL/L (ref 20–31)
COLOR: YELLOW
CREAT SERPL-MCNC: 0.65 MG/DL (ref 0.5–0.9)
EOSINOPHILS RELATIVE PERCENT: 1 % (ref 1–4)
EPITHELIAL CELLS UA: ABNORMAL /HPF (ref 0–5)
GFR SERPL CREATININE-BSD FRML MDRD: >60 ML/MIN/1.73M2
GLUCOSE SERPL-MCNC: 93 MG/DL (ref 70–99)
GLUCOSE UR STRIP.AUTO-MCNC: NEGATIVE MG/DL
HCT VFR BLD AUTO: 37.4 % (ref 36.3–47.1)
HGB BLD-MCNC: 11.8 G/DL (ref 11.9–15.1)
IMMATURE GRANULOCYTES: 1 %
KETONES UR STRIP.AUTO-MCNC: NEGATIVE MG/DL
LEUKOCYTE ESTERASE UR QL STRIP.AUTO: ABNORMAL
LIPASE SERPL-CCNC: 10 U/L (ref 13–60)
LYMPHOCYTES # BLD: 15 % (ref 24–43)
MAGNESIUM SERPL-MCNC: 0.9 MG/DL (ref 1.6–2.6)
MCH RBC QN AUTO: 27 PG (ref 25.2–33.5)
MCHC RBC AUTO-ENTMCNC: 31.6 G/DL (ref 28.4–34.8)
MCV RBC AUTO: 85.6 FL (ref 82.6–102.9)
MONOCYTES # BLD: 10 % (ref 3–12)
NITRITE UR QL STRIP.AUTO: NEGATIVE
NRBC AUTOMATED: 0 PER 100 WBC
PDW BLD-RTO: 14.1 % (ref 11.8–14.4)
PHOSPHATE SERPL-MCNC: 3.7 MG/DL (ref 2.6–4.5)
PLATELET # BLD AUTO: 250 K/UL (ref 138–453)
PMV BLD AUTO: 11.4 FL (ref 8.1–13.5)
POTASSIUM SERPL-SCNC: 2.9 MMOL/L (ref 3.7–5.3)
PROT SERPL-MCNC: 7.3 G/DL (ref 6.4–8.3)
PROT UR STRIP.AUTO-MCNC: 7 MG/DL (ref 5–8)
PROT UR STRIP.AUTO-MCNC: NEGATIVE MG/DL
RBC # BLD: 4.37 M/UL (ref 3.95–5.11)
RBC CLUMPS #/AREA URNS AUTO: ABNORMAL /HPF (ref 0–4)
REASON FOR REJECTION: NORMAL
SEG NEUTROPHILS: 72 % (ref 36–65)
SEGMENTED NEUTROPHILS ABSOLUTE COUNT: 4.92 K/UL (ref 1.5–8.1)
SODIUM SERPL-SCNC: 136 MMOL/L (ref 135–144)
SPECIFIC GRAVITY UA: 1.01 (ref 1–1.03)
TROPONIN I SERPL DL<=0.01 NG/ML-MCNC: 11 NG/L (ref 0–14)
TROPONIN I SERPL DL<=0.01 NG/ML-MCNC: 8 NG/L (ref 0–14)
TSH SERPL-ACNC: 0.68 UIU/ML (ref 0.3–5)
TURBIDITY: ABNORMAL
URINE HGB: NEGATIVE
UROBILINOGEN, URINE: NORMAL
WBC # BLD AUTO: 6.7 K/UL (ref 3.5–11.3)
WBC UA: ABNORMAL /HPF (ref 0–5)
ZZ NTE CLEAN UP: ORDERED TEST: NORMAL
ZZ NTE WITH NAME CLEAN UP: SPECIMEN SOURCE: NORMAL

## 2023-05-01 PROCEDURE — 96375 TX/PRO/DX INJ NEW DRUG ADDON: CPT

## 2023-05-01 PROCEDURE — 6370000000 HC RX 637 (ALT 250 FOR IP): Performed by: STUDENT IN AN ORGANIZED HEALTH CARE EDUCATION/TRAINING PROGRAM

## 2023-05-01 PROCEDURE — 83690 ASSAY OF LIPASE: CPT

## 2023-05-01 PROCEDURE — 6360000002 HC RX W HCPCS: Performed by: NURSE PRACTITIONER

## 2023-05-01 PROCEDURE — 83735 ASSAY OF MAGNESIUM: CPT

## 2023-05-01 PROCEDURE — 2580000003 HC RX 258: Performed by: NURSE PRACTITIONER

## 2023-05-01 PROCEDURE — 93005 ELECTROCARDIOGRAM TRACING: CPT | Performed by: STUDENT IN AN ORGANIZED HEALTH CARE EDUCATION/TRAINING PROGRAM

## 2023-05-01 PROCEDURE — 3074F SYST BP LT 130 MM HG: CPT | Performed by: OBSTETRICS & GYNECOLOGY

## 2023-05-01 PROCEDURE — 2060000000 HC ICU INTERMEDIATE R&B

## 2023-05-01 PROCEDURE — 6360000002 HC RX W HCPCS: Performed by: STUDENT IN AN ORGANIZED HEALTH CARE EDUCATION/TRAINING PROGRAM

## 2023-05-01 PROCEDURE — 81001 URINALYSIS AUTO W/SCOPE: CPT

## 2023-05-01 PROCEDURE — 71260 CT THORAX DX C+: CPT

## 2023-05-01 PROCEDURE — 74177 CT ABD & PELVIS W/CONTRAST: CPT

## 2023-05-01 PROCEDURE — 2580000003 HC RX 258: Performed by: STUDENT IN AN ORGANIZED HEALTH CARE EDUCATION/TRAINING PROGRAM

## 2023-05-01 PROCEDURE — 84100 ASSAY OF PHOSPHORUS: CPT

## 2023-05-01 PROCEDURE — 80053 COMPREHEN METABOLIC PANEL: CPT

## 2023-05-01 PROCEDURE — 6360000004 HC RX CONTRAST MEDICATION: Performed by: STUDENT IN AN ORGANIZED HEALTH CARE EDUCATION/TRAINING PROGRAM

## 2023-05-01 PROCEDURE — 2500000003 HC RX 250 WO HCPCS: Performed by: STUDENT IN AN ORGANIZED HEALTH CARE EDUCATION/TRAINING PROGRAM

## 2023-05-01 PROCEDURE — 96365 THER/PROPH/DIAG IV INF INIT: CPT

## 2023-05-01 PROCEDURE — 96366 THER/PROPH/DIAG IV INF ADDON: CPT

## 2023-05-01 PROCEDURE — 6370000000 HC RX 637 (ALT 250 FOR IP): Performed by: NURSE PRACTITIONER

## 2023-05-01 PROCEDURE — 1123F ACP DISCUSS/DSCN MKR DOCD: CPT | Performed by: OBSTETRICS & GYNECOLOGY

## 2023-05-01 PROCEDURE — G2212 PROLONG OUTPT/OFFICE VIS: HCPCS | Performed by: OBSTETRICS & GYNECOLOGY

## 2023-05-01 PROCEDURE — 82330 ASSAY OF CALCIUM: CPT

## 2023-05-01 PROCEDURE — 87086 URINE CULTURE/COLONY COUNT: CPT

## 2023-05-01 PROCEDURE — 99205 OFFICE O/P NEW HI 60 MIN: CPT | Performed by: OBSTETRICS & GYNECOLOGY

## 2023-05-01 PROCEDURE — 96376 TX/PRO/DX INJ SAME DRUG ADON: CPT

## 2023-05-01 PROCEDURE — 84443 ASSAY THYROID STIM HORMONE: CPT

## 2023-05-01 PROCEDURE — 71045 X-RAY EXAM CHEST 1 VIEW: CPT

## 2023-05-01 PROCEDURE — 99285 EMERGENCY DEPT VISIT HI MDM: CPT

## 2023-05-01 PROCEDURE — 84484 ASSAY OF TROPONIN QUANT: CPT

## 2023-05-01 PROCEDURE — 3078F DIAST BP <80 MM HG: CPT | Performed by: OBSTETRICS & GYNECOLOGY

## 2023-05-01 PROCEDURE — 85025 COMPLETE CBC W/AUTO DIFF WBC: CPT

## 2023-05-01 RX ORDER — BUDESONIDE AND FORMOTEROL FUMARATE DIHYDRATE 160; 4.5 UG/1; UG/1
2 AEROSOL RESPIRATORY (INHALATION) 2 TIMES DAILY
Status: DISCONTINUED | OUTPATIENT
Start: 2023-05-01 | End: 2023-05-04 | Stop reason: HOSPADM

## 2023-05-01 RX ORDER — PANTOPRAZOLE SODIUM 40 MG/1
40 TABLET, DELAYED RELEASE ORAL
Status: DISCONTINUED | OUTPATIENT
Start: 2023-05-02 | End: 2023-05-03

## 2023-05-01 RX ORDER — SODIUM CHLORIDE 9 MG/ML
INJECTION, SOLUTION INTRAVENOUS PRN
Status: DISCONTINUED | OUTPATIENT
Start: 2023-05-01 | End: 2023-05-04 | Stop reason: HOSPADM

## 2023-05-01 RX ORDER — POTASSIUM CHLORIDE 7.45 MG/ML
10 INJECTION INTRAVENOUS PRN
Status: DISCONTINUED | OUTPATIENT
Start: 2023-05-01 | End: 2023-05-04 | Stop reason: HOSPADM

## 2023-05-01 RX ORDER — HYDROCODONE BITARTRATE AND ACETAMINOPHEN 10; 325 MG/1; MG/1
1 TABLET ORAL EVERY 6 HOURS PRN
Status: ON HOLD | COMMUNITY
End: 2023-05-04 | Stop reason: HOSPADM

## 2023-05-01 RX ORDER — METRONIDAZOLE 500 MG/100ML
500 INJECTION, SOLUTION INTRAVENOUS EVERY 8 HOURS
Status: DISCONTINUED | OUTPATIENT
Start: 2023-05-02 | End: 2023-05-04 | Stop reason: HOSPADM

## 2023-05-01 RX ORDER — ATORVASTATIN CALCIUM 40 MG/1
40 TABLET, FILM COATED ORAL NIGHTLY
Status: DISCONTINUED | OUTPATIENT
Start: 2023-05-01 | End: 2023-05-04 | Stop reason: HOSPADM

## 2023-05-01 RX ORDER — SODIUM CHLORIDE 9 MG/ML
INJECTION, SOLUTION INTRAVENOUS CONTINUOUS
Status: DISCONTINUED | OUTPATIENT
Start: 2023-05-01 | End: 2023-05-04 | Stop reason: HOSPADM

## 2023-05-01 RX ORDER — FENTANYL CITRATE 50 UG/ML
50 INJECTION, SOLUTION INTRAMUSCULAR; INTRAVENOUS ONCE
Status: COMPLETED | OUTPATIENT
Start: 2023-05-01 | End: 2023-05-01

## 2023-05-01 RX ORDER — POTASSIUM CHLORIDE 20 MEQ/1
40 TABLET, EXTENDED RELEASE ORAL PRN
Status: DISCONTINUED | OUTPATIENT
Start: 2023-05-01 | End: 2023-05-04 | Stop reason: HOSPADM

## 2023-05-01 RX ORDER — ONDANSETRON 4 MG/1
4 TABLET, ORALLY DISINTEGRATING ORAL EVERY 8 HOURS PRN
Status: DISCONTINUED | OUTPATIENT
Start: 2023-05-01 | End: 2023-05-04 | Stop reason: HOSPADM

## 2023-05-01 RX ORDER — ENOXAPARIN SODIUM 100 MG/ML
40 INJECTION SUBCUTANEOUS DAILY
Status: DISCONTINUED | OUTPATIENT
Start: 2023-05-02 | End: 2023-05-04 | Stop reason: HOSPADM

## 2023-05-01 RX ORDER — MAGNESIUM SULFATE IN WATER 40 MG/ML
2000 INJECTION, SOLUTION INTRAVENOUS ONCE
Status: COMPLETED | OUTPATIENT
Start: 2023-05-01 | End: 2023-05-01

## 2023-05-01 RX ORDER — ACETAMINOPHEN 325 MG/1
650 TABLET ORAL EVERY 6 HOURS PRN
Status: DISCONTINUED | OUTPATIENT
Start: 2023-05-01 | End: 2023-05-04 | Stop reason: HOSPADM

## 2023-05-01 RX ORDER — POTASSIUM CHLORIDE 7.45 MG/ML
10 INJECTION INTRAVENOUS
Status: DISPENSED | OUTPATIENT
Start: 2023-05-01 | End: 2023-05-01

## 2023-05-01 RX ORDER — METRONIDAZOLE 500 MG/100ML
500 INJECTION, SOLUTION INTRAVENOUS ONCE
Status: COMPLETED | OUTPATIENT
Start: 2023-05-01 | End: 2023-05-01

## 2023-05-01 RX ORDER — SODIUM CHLORIDE 0.9 % (FLUSH) 0.9 %
5-40 SYRINGE (ML) INJECTION EVERY 12 HOURS SCHEDULED
Status: DISCONTINUED | OUTPATIENT
Start: 2023-05-01 | End: 2023-05-04 | Stop reason: HOSPADM

## 2023-05-01 RX ORDER — ONDANSETRON 2 MG/ML
4 INJECTION INTRAMUSCULAR; INTRAVENOUS ONCE
Status: COMPLETED | OUTPATIENT
Start: 2023-05-01 | End: 2023-05-01

## 2023-05-01 RX ORDER — ASPIRIN 81 MG/1
81 TABLET ORAL DAILY
Status: DISCONTINUED | OUTPATIENT
Start: 2023-05-02 | End: 2023-05-04 | Stop reason: HOSPADM

## 2023-05-01 RX ORDER — SODIUM CHLORIDE 0.9 % (FLUSH) 0.9 %
10 SYRINGE (ML) INJECTION PRN
Status: DISCONTINUED | OUTPATIENT
Start: 2023-05-01 | End: 2023-05-04 | Stop reason: HOSPADM

## 2023-05-01 RX ORDER — ONDANSETRON 2 MG/ML
4 INJECTION INTRAMUSCULAR; INTRAVENOUS EVERY 6 HOURS PRN
Status: DISCONTINUED | OUTPATIENT
Start: 2023-05-01 | End: 2023-05-04 | Stop reason: HOSPADM

## 2023-05-01 RX ORDER — 0.9 % SODIUM CHLORIDE 0.9 %
1000 INTRAVENOUS SOLUTION INTRAVENOUS ONCE
Status: COMPLETED | OUTPATIENT
Start: 2023-05-01 | End: 2023-05-01

## 2023-05-01 RX ORDER — CETIRIZINE HYDROCHLORIDE 10 MG/1
10 TABLET ORAL DAILY
Status: DISCONTINUED | OUTPATIENT
Start: 2023-05-02 | End: 2023-05-04 | Stop reason: HOSPADM

## 2023-05-01 RX ORDER — POTASSIUM CHLORIDE 20 MEQ/1
40 TABLET, EXTENDED RELEASE ORAL ONCE
Status: COMPLETED | OUTPATIENT
Start: 2023-05-01 | End: 2023-05-01

## 2023-05-01 RX ORDER — CLOPIDOGREL BISULFATE 75 MG/1
75 TABLET ORAL DAILY
Status: DISCONTINUED | OUTPATIENT
Start: 2023-05-02 | End: 2023-05-04 | Stop reason: HOSPADM

## 2023-05-01 RX ORDER — CALCIUM GLUCONATE 20 MG/ML
2000 INJECTION, SOLUTION INTRAVENOUS ONCE
Status: COMPLETED | OUTPATIENT
Start: 2023-05-01 | End: 2023-05-01

## 2023-05-01 RX ORDER — QUETIAPINE FUMARATE 50 MG/1
50 TABLET, EXTENDED RELEASE ORAL NIGHTLY
Status: DISCONTINUED | OUTPATIENT
Start: 2023-05-01 | End: 2023-05-04 | Stop reason: HOSPADM

## 2023-05-01 RX ORDER — ACETAMINOPHEN 650 MG/1
650 SUPPOSITORY RECTAL EVERY 6 HOURS PRN
Status: DISCONTINUED | OUTPATIENT
Start: 2023-05-01 | End: 2023-05-04 | Stop reason: HOSPADM

## 2023-05-01 RX ORDER — ALBUTEROL SULFATE 2.5 MG/3ML
2.5 SOLUTION RESPIRATORY (INHALATION)
Status: DISCONTINUED | OUTPATIENT
Start: 2023-05-01 | End: 2023-05-04 | Stop reason: HOSPADM

## 2023-05-01 RX ORDER — POLYETHYLENE GLYCOL 3350 17 G/17G
17 POWDER, FOR SOLUTION ORAL DAILY PRN
Status: DISCONTINUED | OUTPATIENT
Start: 2023-05-01 | End: 2023-05-04 | Stop reason: HOSPADM

## 2023-05-01 RX ORDER — MAGNESIUM SULFATE 1 G/100ML
1000 INJECTION INTRAVENOUS PRN
Status: DISCONTINUED | OUTPATIENT
Start: 2023-05-01 | End: 2023-05-04 | Stop reason: HOSPADM

## 2023-05-01 RX ORDER — ESCITALOPRAM OXALATE 10 MG/1
10 TABLET ORAL DAILY
Status: DISCONTINUED | OUTPATIENT
Start: 2023-05-02 | End: 2023-05-04 | Stop reason: HOSPADM

## 2023-05-01 RX ADMIN — POTASSIUM CHLORIDE 40 MEQ: 1500 TABLET, EXTENDED RELEASE ORAL at 18:38

## 2023-05-01 RX ADMIN — CALCIUM GLUCONATE 2000 MG: 20 INJECTION, SOLUTION INTRAVENOUS at 19:25

## 2023-05-01 RX ADMIN — QUETIAPINE FUMARATE 50 MG: 50 TABLET, EXTENDED RELEASE ORAL at 23:44

## 2023-05-01 RX ADMIN — DESMOPRESSIN ACETATE 40 MG: 0.2 TABLET ORAL at 23:44

## 2023-05-01 RX ADMIN — ONDANSETRON 4 MG: 2 INJECTION INTRAMUSCULAR; INTRAVENOUS at 21:32

## 2023-05-01 RX ADMIN — SODIUM CHLORIDE 1000 ML: 9 INJECTION, SOLUTION INTRAVENOUS at 16:26

## 2023-05-01 RX ADMIN — MAGNESIUM SULFATE HEPTAHYDRATE 2000 MG: 40 INJECTION, SOLUTION INTRAVENOUS at 18:40

## 2023-05-01 RX ADMIN — FENTANYL CITRATE 50 MCG: 50 INJECTION, SOLUTION INTRAMUSCULAR; INTRAVENOUS at 16:26

## 2023-05-01 RX ADMIN — SODIUM CHLORIDE: 9 INJECTION, SOLUTION INTRAVENOUS at 23:22

## 2023-05-01 RX ADMIN — METRONIDAZOLE 500 MG: 500 INJECTION, SOLUTION INTRAVENOUS at 21:25

## 2023-05-01 RX ADMIN — CEFTRIAXONE SODIUM 1000 MG: 1 INJECTION, POWDER, FOR SOLUTION INTRAMUSCULAR; INTRAVENOUS at 20:48

## 2023-05-01 RX ADMIN — HYDROMORPHONE HYDROCHLORIDE 0.5 MG: 1 INJECTION, SOLUTION INTRAMUSCULAR; INTRAVENOUS; SUBCUTANEOUS at 21:38

## 2023-05-01 RX ADMIN — FENTANYL CITRATE 50 MCG: 50 INJECTION, SOLUTION INTRAMUSCULAR; INTRAVENOUS at 18:45

## 2023-05-01 RX ADMIN — FENTANYL CITRATE 50 MCG: 50 INJECTION, SOLUTION INTRAMUSCULAR; INTRAVENOUS at 23:43

## 2023-05-01 RX ADMIN — POTASSIUM CHLORIDE 10 MEQ: 10 INJECTION, SOLUTION INTRAVENOUS at 18:52

## 2023-05-01 RX ADMIN — ONDANSETRON 4 MG: 2 INJECTION INTRAMUSCULAR; INTRAVENOUS at 16:25

## 2023-05-01 RX ADMIN — POTASSIUM CHLORIDE 10 MEQ: 10 INJECTION, SOLUTION INTRAVENOUS at 21:51

## 2023-05-01 RX ADMIN — POTASSIUM CHLORIDE 10 MEQ: 10 INJECTION, SOLUTION INTRAVENOUS at 20:27

## 2023-05-01 RX ADMIN — MAGNESIUM SULFATE HEPTAHYDRATE 2000 MG: 40 INJECTION, SOLUTION INTRAVENOUS at 21:04

## 2023-05-01 RX ADMIN — IOPAMIDOL 75 ML: 755 INJECTION, SOLUTION INTRAVENOUS at 18:18

## 2023-05-01 ASSESSMENT — ENCOUNTER SYMPTOMS
EYES NEGATIVE: 1
BACK PAIN: 1
NAUSEA: 0
SHORTNESS OF BREATH: 0
DIARRHEA: 0
VOMITING: 0
SHORTNESS OF BREATH: 1
DIARRHEA: 1
COUGH: 0
ABDOMINAL PAIN: 1
NAUSEA: 1
CONSTIPATION: 1
RHINORRHEA: 0

## 2023-05-01 ASSESSMENT — PAIN DESCRIPTION - DESCRIPTORS
DESCRIPTORS: ACHING
DESCRIPTORS: SHARP;BURNING
DESCRIPTORS: BURNING;STABBING

## 2023-05-01 ASSESSMENT — PAIN DESCRIPTION - ORIENTATION
ORIENTATION: LEFT;UPPER;LOWER
ORIENTATION: LOWER;LEFT

## 2023-05-01 ASSESSMENT — PAIN SCALES - GENERAL
PAINLEVEL_OUTOF10: 7
PAINLEVEL_OUTOF10: 8
PAINLEVEL_OUTOF10: 7

## 2023-05-01 ASSESSMENT — LIFESTYLE VARIABLES
HOW MANY STANDARD DRINKS CONTAINING ALCOHOL DO YOU HAVE ON A TYPICAL DAY: PATIENT DOES NOT DRINK
HOW OFTEN DO YOU HAVE A DRINK CONTAINING ALCOHOL: NEVER

## 2023-05-01 ASSESSMENT — PAIN DESCRIPTION - LOCATION
LOCATION: ABDOMEN

## 2023-05-01 ASSESSMENT — PAIN DESCRIPTION - PAIN TYPE: TYPE: ACUTE PAIN

## 2023-05-01 ASSESSMENT — PAIN - FUNCTIONAL ASSESSMENT: PAIN_FUNCTIONAL_ASSESSMENT: 0-10

## 2023-05-01 NOTE — ED TRIAGE NOTES
Pt was at her OBGYN's office when she began to feel dizzy and had some blurred vision. Pt felt like she was gong to pass out, the MD called a rapid response. They found patient to have orthostatic blood pressure and have worries of dehydration.

## 2023-05-01 NOTE — ED PROVIDER NOTES
[facility]     Emergency Department     Faculty Attestation    I performed a history and physical examination of the patient and discussed management with the resident. I reviewed the residents note and agree with the documented findings and plan of care. Any areas of disagreement are noted on the chart. I was personally present for the key portions of any procedures. I have documented in the chart those procedures where I was not present during the key portions. I have reviewed the emergency nurses triage note. I agree with the chief complaint, past medical history, past surgical history, allergies, medications, social and family history as documented unless otherwise noted below. For Physician Assistant/ Nurse Practitioner cases/documentation I have personally evaluated this patient and have completed at least one if not all key elements of the E/M (history, physical exam, and MDM). Additional findings are as noted. Patient with recent diagnosis of ovarian cancer with carcinomatosis presents with dizziness. Patient was found to have hypotension at her gynecology oncology appointment today. Patient says she has been able to drink but has not been eating well due to her abdominal pain which has been chronic for her due to the cancer diagnosis. She denies any worsening or change in her abdominal pain. Patient denies any chest pain. She says she does have shortness of breath but says that that is chronic for her also due to her history of COPD. She is on 2 L of oxygen at home all the time. Patient denies any recent fever, cough, congestion. On exam, patient is resting comfortably in the bed. She is alert and oriented and answering questions appropriately. Lungs are clear to auscultation bilaterally. Abdomen is soft with moderate diffuse tenderness. No rebound or guarding is present. We will get EKG, chest x-ray, labs and reassess.     EKG Interpretation    Interpreted by
thickening worse in the anterior omentum with extension into the anterior pelvis and right paracolic gutter. Carcinomatosis is highly likely. 6. Small volume ascites. RECOMMENDATIONS: Omental biopsy        EKG  All EKG's are interpreted by the Emergency Department Physician who either signs or Co-signs this chart in the absence of a cardiologist.      MEDICATIONS ORDERED:  Orders Placed This Encounter   Medications    0.9 % sodium chloride bolus    fentaNYL (SUBLIMAZE) injection 50 mcg    ondansetron (ZOFRAN) injection 4 mg    magnesium sulfate 2000 mg in 50 mL IVPB premix    potassium chloride 10 mEq/100 mL IVPB (Peripheral Line)    potassium chloride (KLOR-CON M) extended release tablet 40 mEq    fentaNYL (SUBLIMAZE) injection 50 mcg    iopamidol (ISOVUE-370) 76 % injection 75 mL    calcium gluconate 2,000 mg in sodium chloride 100 mL    cefTRIAXone (ROCEPHIN) 1,000 mg in sodium chloride 0.9 % 50 mL IVPB (mini-bag)     Order Specific Question:   Antimicrobial Indications     Answer:   Urinary Tract Infection     Order Specific Question:   Antimicrobial Indications     Answer:   Intra-Abdominal Infection    metronidazole (FLAGYL) 500 mg in 0.9% NaCl 100 mL IVPB premix     Order Specific Question:   Antimicrobial Indications     Answer:   Intra-Abdominal Infection     Order Specific Question:   Antimicrobial Indications     Answer:   Urinary Tract Infection    magnesium sulfate 2000 mg in 50 mL IVPB premix    HYDROmorphone (DILAUDID) injection 0.5 mg    ondansetron (ZOFRAN) injection 4 mg       PROCEDURES:  None      CONSULTS:  IP CONSULT TO OB GYN  IP CONSULT TO HOSPITALIST  IP CONSULT TO GYNECOLOGIC ONCOLOGY      EMERGENCY DEPARTMENT COURSE:  Patient's lab work was remarkable for severe hypomagnesemia and hypokalemia as well as hypocalcemia. All of these were replaced in the emergency department. Anticipate additional replacements. Patient was treated with IV fluids.   General episodes of hypotension in the

## 2023-05-02 PROBLEM — E83.51 HYPOCALCEMIA: Status: ACTIVE | Noted: 2023-05-02

## 2023-05-02 PROBLEM — E87.6 HYPOKALEMIA: Status: ACTIVE | Noted: 2023-05-02

## 2023-05-02 PROBLEM — C78.6 PERITONEAL CARCINOMATOSIS (HCC): Status: ACTIVE | Noted: 2023-05-02

## 2023-05-02 PROBLEM — N30.00 ACUTE CYSTITIS WITHOUT HEMATURIA: Status: ACTIVE | Noted: 2023-05-02

## 2023-05-02 PROBLEM — E83.42 HYPOMAGNESEMIA: Status: ACTIVE | Noted: 2023-05-02

## 2023-05-02 LAB
ANION GAP SERPL CALCULATED.3IONS-SCNC: 11 MMOL/L (ref 9–17)
BUN SERPL-MCNC: 6 MG/DL (ref 8–23)
CALCIUM SERPL-MCNC: 6.3 MG/DL (ref 8.6–10.4)
CHLORIDE SERPL-SCNC: 108 MMOL/L (ref 98–107)
CO2 SERPL-SCNC: 17 MMOL/L (ref 20–31)
CREAT SERPL-MCNC: 0.51 MG/DL (ref 0.5–0.9)
EKG ATRIAL RATE: 65 BPM
EKG P AXIS: 67 DEGREES
EKG P-R INTERVAL: 148 MS
EKG Q-T INTERVAL: 462 MS
EKG QRS DURATION: 80 MS
EKG QTC CALCULATION (BAZETT): 480 MS
EKG R AXIS: 43 DEGREES
EKG T AXIS: 55 DEGREES
EKG VENTRICULAR RATE: 65 BPM
GFR SERPL CREATININE-BSD FRML MDRD: >60 ML/MIN/1.73M2
GLUCOSE SERPL-MCNC: 106 MG/DL (ref 70–99)
INR PPP: 1.1
MAGNESIUM SERPL-MCNC: 1.9 MG/DL (ref 1.6–2.6)
MICROORGANISM SPEC CULT: NORMAL
PHOSPHATE SERPL-MCNC: 3.4 MG/DL (ref 2.6–4.5)
POTASSIUM SERPL-SCNC: 3.3 MMOL/L (ref 3.7–5.3)
PROTHROMBIN TIME: 14.4 SEC (ref 11.7–14.9)
SODIUM SERPL-SCNC: 136 MMOL/L (ref 135–144)
SPECIMEN DESCRIPTION: NORMAL

## 2023-05-02 PROCEDURE — 84100 ASSAY OF PHOSPHORUS: CPT

## 2023-05-02 PROCEDURE — 97162 PT EVAL MOD COMPLEX 30 MIN: CPT

## 2023-05-02 PROCEDURE — 36415 COLL VENOUS BLD VENIPUNCTURE: CPT

## 2023-05-02 PROCEDURE — 94640 AIRWAY INHALATION TREATMENT: CPT

## 2023-05-02 PROCEDURE — 97530 THERAPEUTIC ACTIVITIES: CPT

## 2023-05-02 PROCEDURE — 99223 1ST HOSP IP/OBS HIGH 75: CPT | Performed by: INTERNAL MEDICINE

## 2023-05-02 PROCEDURE — 6360000002 HC RX W HCPCS: Performed by: NURSE PRACTITIONER

## 2023-05-02 PROCEDURE — 6360000002 HC RX W HCPCS: Performed by: INTERNAL MEDICINE

## 2023-05-02 PROCEDURE — 6370000000 HC RX 637 (ALT 250 FOR IP): Performed by: NURSE PRACTITIONER

## 2023-05-02 PROCEDURE — 2500000003 HC RX 250 WO HCPCS: Performed by: NURSE PRACTITIONER

## 2023-05-02 PROCEDURE — 80048 BASIC METABOLIC PNL TOTAL CA: CPT

## 2023-05-02 PROCEDURE — 85610 PROTHROMBIN TIME: CPT

## 2023-05-02 PROCEDURE — 2580000003 HC RX 258: Performed by: NURSE PRACTITIONER

## 2023-05-02 PROCEDURE — 6370000000 HC RX 637 (ALT 250 FOR IP): Performed by: INTERNAL MEDICINE

## 2023-05-02 PROCEDURE — 2060000000 HC ICU INTERMEDIATE R&B

## 2023-05-02 PROCEDURE — 83735 ASSAY OF MAGNESIUM: CPT

## 2023-05-02 RX ORDER — NICOTINE 21 MG/24HR
1 PATCH, TRANSDERMAL 24 HOURS TRANSDERMAL DAILY
Status: DISCONTINUED | OUTPATIENT
Start: 2023-05-02 | End: 2023-05-04 | Stop reason: HOSPADM

## 2023-05-02 RX ORDER — SCOLOPAMINE TRANSDERMAL SYSTEM 1 MG/1
1 PATCH, EXTENDED RELEASE TRANSDERMAL
Status: DISCONTINUED | OUTPATIENT
Start: 2023-05-02 | End: 2023-05-04 | Stop reason: HOSPADM

## 2023-05-02 RX ADMIN — POTASSIUM BICARBONATE 40 MEQ: 782 TABLET, EFFERVESCENT ORAL at 22:51

## 2023-05-02 RX ADMIN — HYDROMORPHONE HYDROCHLORIDE 1 MG: 1 INJECTION, SOLUTION INTRAMUSCULAR; INTRAVENOUS; SUBCUTANEOUS at 19:31

## 2023-05-02 RX ADMIN — ONDANSETRON 4 MG: 2 INJECTION INTRAMUSCULAR; INTRAVENOUS at 17:27

## 2023-05-02 RX ADMIN — HYDROMORPHONE HYDROCHLORIDE 1 MG: 1 INJECTION, SOLUTION INTRAMUSCULAR; INTRAVENOUS; SUBCUTANEOUS at 22:32

## 2023-05-02 RX ADMIN — HYDROMORPHONE HYDROCHLORIDE 1 MG: 1 INJECTION, SOLUTION INTRAMUSCULAR; INTRAVENOUS; SUBCUTANEOUS at 04:36

## 2023-05-02 RX ADMIN — CLOPIDOGREL BISULFATE 75 MG: 75 TABLET, FILM COATED ORAL at 07:34

## 2023-05-02 RX ADMIN — METRONIDAZOLE 500 MG: 500 INJECTION, SOLUTION INTRAVENOUS at 12:18

## 2023-05-02 RX ADMIN — HYDROMORPHONE HYDROCHLORIDE 1 MG: 1 INJECTION, SOLUTION INTRAMUSCULAR; INTRAVENOUS; SUBCUTANEOUS at 06:39

## 2023-05-02 RX ADMIN — METRONIDAZOLE 500 MG: 500 INJECTION, SOLUTION INTRAVENOUS at 22:38

## 2023-05-02 RX ADMIN — DESMOPRESSIN ACETATE 40 MG: 0.2 TABLET ORAL at 20:25

## 2023-05-02 RX ADMIN — Medication 81 MG: at 07:34

## 2023-05-02 RX ADMIN — CETIRIZINE HYDROCHLORIDE 10 MG: 10 TABLET ORAL at 07:34

## 2023-05-02 RX ADMIN — POTASSIUM CHLORIDE 40 MEQ: 1500 TABLET, EXTENDED RELEASE ORAL at 06:40

## 2023-05-02 RX ADMIN — METRONIDAZOLE 500 MG: 500 INJECTION, SOLUTION INTRAVENOUS at 04:39

## 2023-05-02 RX ADMIN — HYDROMORPHONE HYDROCHLORIDE 1 MG: 1 INJECTION, SOLUTION INTRAMUSCULAR; INTRAVENOUS; SUBCUTANEOUS at 16:28

## 2023-05-02 RX ADMIN — POTASSIUM CHLORIDE 10 MEQ: 7.46 INJECTION, SOLUTION INTRAVENOUS at 00:00

## 2023-05-02 RX ADMIN — SODIUM CHLORIDE, PRESERVATIVE FREE 10 ML: 5 INJECTION INTRAVENOUS at 22:39

## 2023-05-02 RX ADMIN — PANTOPRAZOLE SODIUM 40 MG: 40 TABLET, DELAYED RELEASE ORAL at 16:28

## 2023-05-02 RX ADMIN — HYDROMORPHONE HYDROCHLORIDE 1 MG: 1 INJECTION, SOLUTION INTRAMUSCULAR; INTRAVENOUS; SUBCUTANEOUS at 09:23

## 2023-05-02 RX ADMIN — BUDESONIDE AND FORMOTEROL FUMARATE DIHYDRATE 2 PUFF: 160; 4.5 AEROSOL RESPIRATORY (INHALATION) at 09:18

## 2023-05-02 RX ADMIN — ESCITALOPRAM OXALATE 10 MG: 10 TABLET ORAL at 07:34

## 2023-05-02 RX ADMIN — CEFTRIAXONE SODIUM 1000 MG: 10 INJECTION, POWDER, FOR SOLUTION INTRAVENOUS at 21:23

## 2023-05-02 RX ADMIN — ENOXAPARIN SODIUM 40 MG: 40 INJECTION SUBCUTANEOUS at 07:33

## 2023-05-02 RX ADMIN — PANTOPRAZOLE SODIUM 40 MG: 40 TABLET, DELAYED RELEASE ORAL at 06:40

## 2023-05-02 RX ADMIN — HYDROMORPHONE HYDROCHLORIDE 1 MG: 1 INJECTION, SOLUTION INTRAMUSCULAR; INTRAVENOUS; SUBCUTANEOUS at 02:36

## 2023-05-02 RX ADMIN — ONDANSETRON 4 MG: 2 INJECTION INTRAMUSCULAR; INTRAVENOUS at 04:57

## 2023-05-02 RX ADMIN — BUDESONIDE AND FORMOTEROL FUMARATE DIHYDRATE 2 PUFF: 160; 4.5 AEROSOL RESPIRATORY (INHALATION) at 21:18

## 2023-05-02 RX ADMIN — HYDROMORPHONE HYDROCHLORIDE 1 MG: 1 INJECTION, SOLUTION INTRAMUSCULAR; INTRAVENOUS; SUBCUTANEOUS at 12:20

## 2023-05-02 RX ADMIN — ONDANSETRON 4 MG: 2 INJECTION INTRAMUSCULAR; INTRAVENOUS at 10:47

## 2023-05-02 RX ADMIN — SODIUM CHLORIDE, PRESERVATIVE FREE 10 ML: 5 INJECTION INTRAVENOUS at 07:33

## 2023-05-02 ASSESSMENT — PAIN SCALES - GENERAL
PAINLEVEL_OUTOF10: 0
PAINLEVEL_OUTOF10: 6
PAINLEVEL_OUTOF10: 7
PAINLEVEL_OUTOF10: 8

## 2023-05-02 ASSESSMENT — PAIN DESCRIPTION - LOCATION
LOCATION: ABDOMEN

## 2023-05-02 ASSESSMENT — PAIN DESCRIPTION - ORIENTATION: ORIENTATION: RIGHT;LEFT;UPPER;LOWER

## 2023-05-02 ASSESSMENT — PAIN DESCRIPTION - DESCRIPTORS: DESCRIPTORS: BURNING;CRAMPING;STABBING

## 2023-05-02 NOTE — H&P
She started smoking about 52 years ago. She has a 50.00 pack-year smoking history. She has never used smokeless tobacco.  Alcohol:      has no history on file for alcohol use. Drug Use:  has no history on file for drug use. Family History:     Denies family history DVT PE    Review of Systems:     Positive and Negative as described in HPI. Review of Systems  Limited historian as patient does describe acute severe pain difficulty focusing on questions saying \"I cannot think right now it hurts so bad\". Complains of 9 out of 10 pain during provider evaluation. Denies dysphagia or odynophagia or choking episodes . patient denies any sick contacts. Denies PND orthopnea. Denies syncope. Denies orthostasis. Denies myalgias or arthralgias, radicular symptoms or lumbago. Denies worsening URI symptoms, no exposure to COVID-19, denies flulike symptoms. Denies recent issues with COPD bronchitis, does not use oxygen at home but was on nasal cannula after arrival.  Denies prior DVT PE. Does have restless legs but denies any limb claudication. Does describe mild pedal edema but denies any acute worsening changes. 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 Not currently on treatment. Denies dysuria hematuria hesitancy or difficulty urinating. review of systems otherwise -12 systems attempted and otherwise unremarkable    Physical Exam:   BP (!) 152/64   Pulse 62   Temp 98.1 °F (36.7 °C) (Oral)   Resp 21   Ht 5' 7\" (1.702 m)   Wt 153 lb (69.4 kg)   SpO2 93%   BMI 23.96 kg/m²   Temp (24hrs), Av.8 °F (36.6 °C), Min:97.2 °F (36.2 °C), Max:98.1 °F (36.7 °C)    No results for input(s): POCGLU in the last 72 hours.   No intake or output data in the 24 hours ending 23 0300    Physical Exam  General appears older than stated age, chronically ill-appearing female laying in fetal position, nursing with discomfort appropriate with redirection, follows commands, oriented  Eye exam

## 2023-05-02 NOTE — ED NOTES
Pt ambulated to the bathroom with one assist. Strong , steady gait noted     Parth Comer RN  05/01/23 8597
Pt to CT scan     Keo Mayer RN  05/01/23 5554
Pt up to commode     Elvin Montanez, [facility]  05/01/23 3345
Pt up to commode, tolerated well     Evelyn Coello, RN  05/01/23 1942
Pt updated on CT results that are still in process, pt verbalized understanding, RR even and non-labored, JOSUÉ Blackwell, RN  05/01/23 2004
05/01/23 Left;Posterior Hand (Active)       Ambulatory Status:  Presents to emergency department  because of falls (Syncope, seizure, or loss of consciousness): No, Age > 79: No, Altered Mental Status, Intoxication with alcohol or substance confusion (Disorientation, impaired judgment, poor safety awaremess, or inability to follow instructions): No, Impaired Mobility: Ambulates or transfers with assistive devices or assistance;  Unable to ambulate or transer.: No, Nursing Judgement: No    Diagnosis:  DISPOSITION Admitted 05/01/2023 09:28:14 PM   Final diagnoses:   None        Code Status: [unfilled]     Consults:  []  Hospitalist  Completed  [] yes [] no  []  Medicine  Completed  [] yes [] No  []  Cardiology  Completed  [] yes [] No  []  GI   Completed  [] yes [] No  []  Neurology  Completed  [] yes [] No  []  Nephrology Completed  [] yes [] No  []  Vascular  Completed  [] yes [] No   []  Surgery  Completed  [] yes [] No   []  Urology  Completed  [] yes [] No   []  Plastics  Completed  [] yes [] No   []  ENT  Completed  [] yes [] No   []  Other:  Completed  [] yes [] No    Consults:  IP CONSULT TO OB GYN  IP CONSULT TO HOSPITALIST  IP CONSULT TO GYNECOLOGIC ONCOLOGY     Treatment Team:   Treatment Team: Attending Provider: Christopher Ricketts MD; Resident: Marcial Pino DO; Registered Nurse: Arley Toro RN; Consulting Physician: Ulysses Smith MD    Treatment:              Skin Assessment:        Pain Score:  Pain Assessment  Pain Assessment: 0-10  Pain Level: 8  Pain Location: Abdomen  Pain Descriptors: Aching  Pain Type: Acute pain      SOCIAL HISTORY       Social History     Socioeconomic History    Marital status:      Spouse name: None    Number of children: None    Years of education: None    Highest education level: None   Tobacco Use    Smoking status: Every Day     Packs/day: 1.00     Years: 50.00     Pack years: 50.00     Types: Cigarettes     Start date: 28    Smokeless tobacco: Never

## 2023-05-02 NOTE — PLAN OF CARE
Problem: Discharge Planning  Goal: Discharge to home or other facility with appropriate resources  5/2/2023 1010 by Yaneth Donato RN  Outcome: Progressing  5/2/2023 0131 by Seth Pillai RN  Outcome: Progressing     Problem: Pain  Goal: Verbalizes/displays adequate comfort level or baseline comfort level  5/2/2023 1010 by Yaneth Donato RN  Outcome: Progressing  5/2/2023 0131 by Seth Pillai RN  Outcome: Progressing     Problem: Skin/Tissue Integrity  Goal: Absence of new skin breakdown  Description: 1. Monitor for areas of redness and/or skin breakdown  2. Assess vascular access sites hourly  3. Every 4-6 hours minimum:  Change oxygen saturation probe site  4. Every 4-6 hours:  If on nasal continuous positive airway pressure, respiratory therapy assess nares and determine need for appliance change or resting period.   Outcome: Progressing     Problem: Safety - Adult  Goal: Free from fall injury  Outcome: Progressing

## 2023-05-03 PROBLEM — R11.0 NAUSEA: Status: ACTIVE | Noted: 2023-05-03

## 2023-05-03 PROBLEM — Z51.5 ENCOUNTER FOR PALLIATIVE CARE: Status: ACTIVE | Noted: 2023-05-03

## 2023-05-03 LAB
ANION GAP SERPL CALCULATED.3IONS-SCNC: 11 MMOL/L (ref 9–17)
BUN SERPL-MCNC: 7 MG/DL (ref 8–23)
CALCIUM SERPL-MCNC: 7.4 MG/DL (ref 8.6–10.4)
CHLORIDE SERPL-SCNC: 107 MMOL/L (ref 98–107)
CO2 SERPL-SCNC: 19 MMOL/L (ref 20–31)
CREAT SERPL-MCNC: 0.49 MG/DL (ref 0.5–0.9)
GFR SERPL CREATININE-BSD FRML MDRD: >60 ML/MIN/1.73M2
GLUCOSE BLD-MCNC: 212 MG/DL (ref 65–105)
GLUCOSE BLD-MCNC: 91 MG/DL (ref 65–105)
GLUCOSE SERPL-MCNC: 91 MG/DL (ref 70–99)
HCT VFR BLD AUTO: 32.3 % (ref 36.3–47.1)
HGB BLD-MCNC: 9.7 G/DL (ref 11.9–15.1)
MCH RBC QN AUTO: 27.1 PG (ref 25.2–33.5)
MCHC RBC AUTO-ENTMCNC: 30 G/DL (ref 28.4–34.8)
MCV RBC AUTO: 90.2 FL (ref 82.6–102.9)
NRBC AUTOMATED: 0 PER 100 WBC
PDW BLD-RTO: 14.2 % (ref 11.8–14.4)
PLATELET # BLD AUTO: 186 K/UL (ref 138–453)
PMV BLD AUTO: 11.5 FL (ref 8.1–13.5)
POTASSIUM SERPL-SCNC: 4.2 MMOL/L (ref 3.7–5.3)
RBC # BLD: 3.58 M/UL (ref 3.95–5.11)
SODIUM SERPL-SCNC: 137 MMOL/L (ref 135–144)
WBC # BLD AUTO: 5.6 K/UL (ref 3.5–11.3)

## 2023-05-03 PROCEDURE — 6360000002 HC RX W HCPCS: Performed by: NURSE PRACTITIONER

## 2023-05-03 PROCEDURE — C9113 INJ PANTOPRAZOLE SODIUM, VIA: HCPCS | Performed by: INTERNAL MEDICINE

## 2023-05-03 PROCEDURE — 99222 1ST HOSP IP/OBS MODERATE 55: CPT | Performed by: INTERNAL MEDICINE

## 2023-05-03 PROCEDURE — 2700000000 HC OXYGEN THERAPY PER DAY

## 2023-05-03 PROCEDURE — 94761 N-INVAS EAR/PLS OXIMETRY MLT: CPT

## 2023-05-03 PROCEDURE — 2580000003 HC RX 258: Performed by: INTERNAL MEDICINE

## 2023-05-03 PROCEDURE — 6370000000 HC RX 637 (ALT 250 FOR IP): Performed by: NURSE PRACTITIONER

## 2023-05-03 PROCEDURE — 80048 BASIC METABOLIC PNL TOTAL CA: CPT

## 2023-05-03 PROCEDURE — 82947 ASSAY GLUCOSE BLOOD QUANT: CPT

## 2023-05-03 PROCEDURE — 85027 COMPLETE CBC AUTOMATED: CPT

## 2023-05-03 PROCEDURE — 6360000002 HC RX W HCPCS: Performed by: INTERNAL MEDICINE

## 2023-05-03 PROCEDURE — 99232 SBSQ HOSP IP/OBS MODERATE 35: CPT | Performed by: INTERNAL MEDICINE

## 2023-05-03 PROCEDURE — 2500000003 HC RX 250 WO HCPCS: Performed by: NURSE PRACTITIONER

## 2023-05-03 PROCEDURE — 94640 AIRWAY INHALATION TREATMENT: CPT

## 2023-05-03 PROCEDURE — 36415 COLL VENOUS BLD VENIPUNCTURE: CPT

## 2023-05-03 PROCEDURE — 6370000000 HC RX 637 (ALT 250 FOR IP): Performed by: INTERNAL MEDICINE

## 2023-05-03 PROCEDURE — 99497 ADVNCD CARE PLAN 30 MIN: CPT | Performed by: INTERNAL MEDICINE

## 2023-05-03 PROCEDURE — 2580000003 HC RX 258: Performed by: NURSE PRACTITIONER

## 2023-05-03 PROCEDURE — 2060000000 HC ICU INTERMEDIATE R&B

## 2023-05-03 RX ORDER — PROCHLORPERAZINE EDISYLATE 5 MG/ML
10 INJECTION INTRAMUSCULAR; INTRAVENOUS EVERY 6 HOURS PRN
Status: DISCONTINUED | OUTPATIENT
Start: 2023-05-03 | End: 2023-05-04 | Stop reason: HOSPADM

## 2023-05-03 RX ORDER — DIPHENHYDRAMINE HYDROCHLORIDE 50 MG/ML
12.5 INJECTION INTRAMUSCULAR; INTRAVENOUS EVERY 6 HOURS PRN
Status: DISCONTINUED | OUTPATIENT
Start: 2023-05-03 | End: 2023-05-04 | Stop reason: HOSPADM

## 2023-05-03 RX ORDER — DEXAMETHASONE SODIUM PHOSPHATE 4 MG/ML
4 INJECTION, SOLUTION INTRA-ARTICULAR; INTRALESIONAL; INTRAMUSCULAR; INTRAVENOUS; SOFT TISSUE EVERY 12 HOURS
Status: DISCONTINUED | OUTPATIENT
Start: 2023-05-03 | End: 2023-05-04 | Stop reason: HOSPADM

## 2023-05-03 RX ADMIN — SODIUM CHLORIDE, PRESERVATIVE FREE 10 ML: 5 INJECTION INTRAVENOUS at 07:57

## 2023-05-03 RX ADMIN — HYDROMORPHONE HYDROCHLORIDE 2 MG: 1 INJECTION, SOLUTION INTRAMUSCULAR; INTRAVENOUS; SUBCUTANEOUS at 12:34

## 2023-05-03 RX ADMIN — DEXAMETHASONE SODIUM PHOSPHATE 4 MG: 4 INJECTION, SOLUTION INTRAMUSCULAR; INTRAVENOUS at 15:05

## 2023-05-03 RX ADMIN — BUDESONIDE AND FORMOTEROL FUMARATE DIHYDRATE 2 PUFF: 160; 4.5 AEROSOL RESPIRATORY (INHALATION) at 08:33

## 2023-05-03 RX ADMIN — METRONIDAZOLE 500 MG: 500 INJECTION, SOLUTION INTRAVENOUS at 12:34

## 2023-05-03 RX ADMIN — PANTOPRAZOLE SODIUM 40 MG: 40 TABLET, DELAYED RELEASE ORAL at 07:41

## 2023-05-03 RX ADMIN — METRONIDAZOLE 500 MG: 500 INJECTION, SOLUTION INTRAVENOUS at 05:51

## 2023-05-03 RX ADMIN — ENOXAPARIN SODIUM 40 MG: 40 INJECTION SUBCUTANEOUS at 07:41

## 2023-05-03 RX ADMIN — BUDESONIDE AND FORMOTEROL FUMARATE DIHYDRATE 2 PUFF: 160; 4.5 AEROSOL RESPIRATORY (INHALATION) at 22:13

## 2023-05-03 RX ADMIN — HYDROMORPHONE HYDROCHLORIDE 1 MG: 1 INJECTION, SOLUTION INTRAMUSCULAR; INTRAVENOUS; SUBCUTANEOUS at 07:42

## 2023-05-03 RX ADMIN — METRONIDAZOLE 500 MG: 500 INJECTION, SOLUTION INTRAVENOUS at 20:44

## 2023-05-03 RX ADMIN — CLOPIDOGREL BISULFATE 75 MG: 75 TABLET, FILM COATED ORAL at 07:41

## 2023-05-03 RX ADMIN — SODIUM CHLORIDE, PRESERVATIVE FREE 40 MG: 5 INJECTION INTRAVENOUS at 15:05

## 2023-05-03 RX ADMIN — HYDROMORPHONE HYDROCHLORIDE 1 MG: 1 INJECTION, SOLUTION INTRAMUSCULAR; INTRAVENOUS; SUBCUTANEOUS at 03:38

## 2023-05-03 RX ADMIN — Medication 81 MG: at 07:41

## 2023-05-03 RX ADMIN — DIPHENHYDRAMINE HYDROCHLORIDE 12.5 MG: 50 INJECTION, SOLUTION INTRAMUSCULAR; INTRAVENOUS at 12:34

## 2023-05-03 RX ADMIN — QUETIAPINE FUMARATE 50 MG: 50 TABLET, EXTENDED RELEASE ORAL at 23:03

## 2023-05-03 RX ADMIN — ONDANSETRON 4 MG: 2 INJECTION INTRAMUSCULAR; INTRAVENOUS at 22:08

## 2023-05-03 RX ADMIN — ONDANSETRON 4 MG: 2 INJECTION INTRAMUSCULAR; INTRAVENOUS at 16:32

## 2023-05-03 RX ADMIN — ESCITALOPRAM OXALATE 10 MG: 10 TABLET ORAL at 07:41

## 2023-05-03 RX ADMIN — SODIUM CHLORIDE, PRESERVATIVE FREE 10 ML: 5 INJECTION INTRAVENOUS at 20:44

## 2023-05-03 RX ADMIN — HYDROMORPHONE HYDROCHLORIDE 1 MG: 1 INJECTION, SOLUTION INTRAMUSCULAR; INTRAVENOUS; SUBCUTANEOUS at 05:48

## 2023-05-03 RX ADMIN — HYDROMORPHONE HYDROCHLORIDE 2 MG: 1 INJECTION, SOLUTION INTRAMUSCULAR; INTRAVENOUS; SUBCUTANEOUS at 20:05

## 2023-05-03 RX ADMIN — CEFTRIAXONE SODIUM 1000 MG: 10 INJECTION, POWDER, FOR SOLUTION INTRAVENOUS at 20:37

## 2023-05-03 RX ADMIN — ONDANSETRON 4 MG: 2 INJECTION INTRAMUSCULAR; INTRAVENOUS at 08:53

## 2023-05-03 RX ADMIN — DESMOPRESSIN ACETATE 40 MG: 0.2 TABLET ORAL at 20:36

## 2023-05-03 ASSESSMENT — PAIN SCALES - GENERAL
PAINLEVEL_OUTOF10: 8
PAINLEVEL_OUTOF10: 4

## 2023-05-03 ASSESSMENT — PAIN DESCRIPTION - LOCATION: LOCATION: ABDOMEN

## 2023-05-03 ASSESSMENT — PAIN DESCRIPTION - ORIENTATION: ORIENTATION: LOWER

## 2023-05-03 ASSESSMENT — PAIN DESCRIPTION - DESCRIPTORS: DESCRIPTORS: ACHING

## 2023-05-03 ASSESSMENT — PAIN - FUNCTIONAL ASSESSMENT: PAIN_FUNCTIONAL_ASSESSMENT: ACTIVITIES ARE NOT PREVENTED

## 2023-05-03 NOTE — PLAN OF CARE
Problem: Discharge Planning  Goal: Discharge to home or other facility with appropriate resources  5/3/2023 0953 by Twila Scott RN  Outcome: Progressing  5/3/2023 0613 by Chao Gordon RN  Outcome: Progressing     Problem: Pain  Goal: Verbalizes/displays adequate comfort level or baseline comfort level  5/3/2023 0953 by Twila Scott RN  Outcome: Progressing  5/3/2023 0613 by Chao Gordon RN  Outcome: Progressing     Problem: Skin/Tissue Integrity  Goal: Absence of new skin breakdown  Description: 1. Monitor for areas of redness and/or skin breakdown  2. Assess vascular access sites hourly  3. Every 4-6 hours minimum:  Change oxygen saturation probe site  4. Every 4-6 hours:  If on nasal continuous positive airway pressure, respiratory therapy assess nares and determine need for appliance change or resting period.   5/3/2023 0953 by Twila Scott RN  Outcome: Progressing  5/3/2023 6198 by Chao Gordon RN  Outcome: Progressing     Problem: Safety - Adult  Goal: Free from fall injury  5/3/2023 0953 by Twila Scott RN  Outcome: Progressing  5/3/2023 0613 by Chao Gordon RN  Outcome: Progressing

## 2023-05-03 NOTE — CARE COORDINATION
Case Management Assessment  Initial Evaluation    Date/Time of Evaluation: 5/3/2023 1:00PM  Assessment Completed by: Elisa Mesa RN    If patient is discharged prior to next notation, then this note serves as note for discharge by case management. Patient Name: Delaney Carter                   YOB: 1955  Diagnosis: Colitis [K52.9]                   Date / Time: 5/1/2023  3:34 PM    Patient Admission Status: Inpatient   Readmission Risk (Low < 19, Mod (19-27), High > 27): Readmission Risk Score: 15    Current PCP: Brenda Graves MD  PCP verified by CM? Yes Brenda Graves MD)    Chart Reviewed: Yes      History Provided by: Patient  Patient Orientation: Alert and Oriented, Person, Place, Situation    Patient Cognition: Alert    Hospitalization in the last 30 days (Readmission):  No    If yes, Readmission Assessment in  Navigator will be completed. Advance Directives:      Code Status: Full Code   Patient's Primary Decision Maker is: Legal Next of Kin    Primary Decision MakerRazulgeeta Ta  Brother/Sister - [phone]    Secondary Decision Maker: [facility] - [phone]    Supplemental (Other) Decision Maker: Clare Rolle Child - [phone]    Discharge Planning:    Patient lives with: Other (Comment) (has own room, common living room) Type of Home: House (rooming house)  Primary Care Giver: Self  Patient Support Systems include: Family Members, Children   Current Financial resources: Medicaid  Current community resources: None  Current services prior to admission: Oxygen Therapy, Durable Medical Equipment            Current DME: Oxygen Therapy (Comment) ([facility]ari)            Type of Home Care services:  [employer] (doesn't know company)    ADLS  Prior functional level: Independent in ADLs/IADLs  Current functional level: Independent in ADLs/IADLs    PT AM-PAC: 17 /24  OT AM-PAC:   /24    Family can provide assistance at DC:     Would you like Case Management to discuss the

## 2023-05-03 NOTE — ACP (ADVANCE CARE PLANNING)
Advance Care Planning     Advance Care Planning (ACP) Physician/NP/PA (Provider) Conversation      Date of ACP Conversation: 5/1/2023    Conversation Conducted with:   Patient with Decision Making Capacity    Health Care Decision Maker:    Current Designated Health Care Decision Maker:  the patient's daughter and son together    Care Preferences:    Hospitalization: \"If your health worsens and it becomes clear that your chance of recovery is unlikely, what would your preference be regarding hospitalization? \"  Currently hospitalized      Ventilation: \"If you were in your present state of health and suddenly became very ill and were unable to breathe on your own, what would your preference be about the use of a ventilator (breathing machine) if it was available to you? \"    Yes, full code    \"If your health worsens and it becomes clear that your chance of recovery is unlikely, what would your preference be about the use of a ventilator (breathing machine) if it was available to you? \"   Yes, full code    Resuscitation:  \"CPR works best to restart the heart when there is a sudden event, like a heart attack, in someone who is otherwise healthy. Unfortunately, CPR does not typically restart the heart for people who have serious health conditions or who are very sick. \"    \"In the event your heart stopped as a result of an underlying serious health condition, would you want attempts to be made to restart your heart (answer \"yes\" for attempt to resuscitate) or would you prefer a natural death (answer \"no\" for do not attempt to resuscitate)? \"   Yes, full code     NOTE: If the patient has a valid advance directive AND provides care preference(s) that are inconsistent with that prior directive, advise the patient to consider either: creating a new advance directive that complies with state-specific requirements; or, if that is not possible, orally revoking that prior directive in accordance with state-specific requirements,

## 2023-05-03 NOTE — CONSULTS
Can't do hobbies/housework; intake normal or reduced; occasional assist; LOC full/confusion  ___50%  Mainly sit/lie; Extensive disease; Can't do any work; Considerable assist; intake normal or reduced; LOC full/confusion  ___40%  Mainly in bed; Extensive disease; Mainly assist; intake normal or reduced; LOC full/confusion   ___30%  Bed Bound; Extensive disease; Total care; intake reduced; LOC full/confusion  ___20%  Bed Bound; Extensive disease; Total care; intake minimal; Drowsy/coma  ___10%  Bed Bound; Extensive disease; Total care; Mouth care only; Drowsy/coma  ___0       Death    Principle Problem/Diagnosis:  Principal Problem:    Colitis  Active Problems:    Abdominal pain    Type 2 diabetes mellitus without complications (HCC)    Mixed bipolar I disorder (HCC)    Essential hypertension    Gastroesophageal reflux disease    Prsnl hx of TIA (TIA), and cereb infrc w/o resid deficits    Peripheral vascular disease, unspecified (HCC)    Chronic hepatitis C ([facility]    Peritoneal carcinomatosis ([facility]    Acute cystitis without hematuria    Hypocalcemia    Hypokalemia    Hypomagnesemia  Resolved Problems:    * No resolved hospital problems. *      Please call with any palliative questions or concerns. Palliative Care Team is available via perfect serve or via phone. This note has been dictated by dragon, typing errors may be a possibility.   The total time I spent in seeing the patient, discussing goals of care, advanced directives, code status, greater than 50% time in counseling and other major issues was more than 75 minutes      Electronically signed by      Jose Flores Fellow  [address] [place] [address]  5/3/2023 9:56 AM  Palliative care office: [phone]
spine and sacrum demonstrate no lytic or destructive changes. Impression: 1. No evidence of acute pulmonary embolism or acute aortic disease. Mild  prominence of the main pulmonary artery measuring 3.1 cm.  2. No evidence of aortic aneurysm or dissection. 3. Severe emphysema. No evidence of consolidation pulmonary edema or active  pleural disease. No evidence of discrete masses. No evidence of  lymphadenopathy. 4. Significant thickening of transverse colon as well as a portion of the  ascending colon consistent with acute colitis, not seen on March 16, 2023.  5. Worsening peritoneal thickening worse in the anterior omentum with  extension into the anterior pelvis and right paracolic gutter. Carcinomatosis is highly likely. 6. Small volume ascites. RECOMMENDATIONS:  Omental biopsy  XR CHEST PORTABLE  Narrative: EXAMINATION:  ONE XRAY VIEW OF THE CHEST    5/1/2023 4:32 pm    COMPARISON:  12/06/2022    HISTORY:  ORDERING SYSTEM PROVIDED HISTORY: dizziness, hx of lung cancer  TECHNOLOGIST PROVIDED HISTORY:  dizziness, hx of lung cancer  Reason for Exam: dizziness/ hx. lung CA/  AP erect/ port. FINDINGS:  The lungs are without acute focal process. There is no effusion or  pneumothorax. The cardiomediastinal silhouette is stable. The osseous  structures are stable. Impression: No acute process. ASSESSMENT & PLAN:    Clover Ingram is a 74 y.o. female [id number] with N/V/D, dizziness in the setting of electrolyte abnormalities, colitis, possible UTI   - Vitals on admission overall stable. O2 saturation 94% on admission on her 2L of O2 which she uses at all times. No tachycardia or fever.  BP stable  - Pt has reported nausea and vomiting, was able to tolerate PO breakfast  - Pt having loose stools, report last formed stool about a week ago  - CBC stable, no leukocytosis  - Hypokalemia of 2.9 and hypomagnesia at 0.9 noted, otherwise creatinine and LFTs stable, lipase wnl  - CXR negative, troponin

## 2023-05-03 NOTE — ACP (ADVANCE CARE PLANNING)
Advance Care Planning     Advance Care Planning Inpatient Note  Spiritual Care Department    Today's Date: 5/3/2023  Unit: [facility] STEPDOWN    Received request from [employer]. Upon review of chart and communication with care team, patient's decision making abilities are not in question. . Patient was/were present in the room during visit. Goals of ACP Conversation:  Complete AD documents    Health Care Decision Makers:       Primary Decision Maker: Miriam Brizuela - Brother/Sister - [phone]    Secondary Decision Maker: Pam Rener - Child - [phone]    Supplemental (Other) Decision Maker: Aaron Bennett - Child - [phone], pt stated that daughter currently does not have a working phone     Summary:  Completed [facility] [address] (Patient Wishes):  Healthcare Power of /Advance Directive Appointment of Health Care Agent  Living Will/Advance Directive     Assessment:  Patient was very clear that if she is in a coma that is not medically induced, than she wants to be taken off of life support after 30 days.  explained that she needs to make sure her HCPOA's are aware of that. Interventions:  Provided education on documents for clarity and greater understanding  Assisted in the completion of documents according to patient's wishes at this time  Encouraged ongoing ACP conversation with future decision makers and loved ones    Care Preferences Communicated:   No    Outcomes/Plan:  New advance directive completed. Returned original document(s) to patient, as well as copies for distribution to appointed agents  Copy of advance directive given to staff to scan into medical record.     Electronically signed by Cristy Arenas [facility] on 5/3/2023 at 2:46 PM

## 2023-05-03 NOTE — PLAN OF CARE
Problem: Discharge Planning  Goal: Discharge to home or other facility with appropriate resources  Outcome: Progressing     Problem: Pain  Goal: Verbalizes/displays adequate comfort level or baseline comfort level  Outcome: Progressing     Problem: Skin/Tissue Integrity  Goal: Absence of new skin breakdown  Outcome: Progressing     Problem: Safety - Adult  Goal: Free from fall injury  Outcome: Progressing

## 2023-05-04 VITALS
OXYGEN SATURATION: 96 % | DIASTOLIC BLOOD PRESSURE: 68 MMHG | SYSTOLIC BLOOD PRESSURE: 148 MMHG | WEIGHT: 170.42 LBS | HEART RATE: 80 BPM | HEIGHT: 67 IN | BODY MASS INDEX: 26.75 KG/M2 | TEMPERATURE: 98.1 F | RESPIRATION RATE: 18 BRPM

## 2023-05-04 LAB
ANION GAP SERPL CALCULATED.3IONS-SCNC: 13 MMOL/L (ref 9–17)
BUN SERPL-MCNC: 6 MG/DL (ref 8–23)
CALCIUM SERPL-MCNC: 9 MG/DL (ref 8.6–10.4)
CHLORIDE SERPL-SCNC: 104 MMOL/L (ref 98–107)
CO2 SERPL-SCNC: 22 MMOL/L (ref 20–31)
CREAT SERPL-MCNC: 0.55 MG/DL (ref 0.5–0.9)
GFR SERPL CREATININE-BSD FRML MDRD: >60 ML/MIN/1.73M2
GLUCOSE SERPL-MCNC: 144 MG/DL (ref 70–99)
POTASSIUM SERPL-SCNC: 4.7 MMOL/L (ref 3.7–5.3)
SODIUM SERPL-SCNC: 139 MMOL/L (ref 135–144)

## 2023-05-04 PROCEDURE — 2500000003 HC RX 250 WO HCPCS: Performed by: NURSE PRACTITIONER

## 2023-05-04 PROCEDURE — 80048 BASIC METABOLIC PNL TOTAL CA: CPT

## 2023-05-04 PROCEDURE — 6360000002 HC RX W HCPCS: Performed by: INTERNAL MEDICINE

## 2023-05-04 PROCEDURE — C9113 INJ PANTOPRAZOLE SODIUM, VIA: HCPCS | Performed by: INTERNAL MEDICINE

## 2023-05-04 PROCEDURE — 36415 COLL VENOUS BLD VENIPUNCTURE: CPT

## 2023-05-04 PROCEDURE — 6370000000 HC RX 637 (ALT 250 FOR IP): Performed by: INTERNAL MEDICINE

## 2023-05-04 PROCEDURE — 97530 THERAPEUTIC ACTIVITIES: CPT

## 2023-05-04 PROCEDURE — 6360000002 HC RX W HCPCS: Performed by: NURSE PRACTITIONER

## 2023-05-04 PROCEDURE — 2580000003 HC RX 258: Performed by: INTERNAL MEDICINE

## 2023-05-04 PROCEDURE — 99232 SBSQ HOSP IP/OBS MODERATE 35: CPT | Performed by: INTERNAL MEDICINE

## 2023-05-04 PROCEDURE — 6370000000 HC RX 637 (ALT 250 FOR IP): Performed by: NURSE PRACTITIONER

## 2023-05-04 PROCEDURE — 2580000003 HC RX 258: Performed by: NURSE PRACTITIONER

## 2023-05-04 PROCEDURE — 97166 OT EVAL MOD COMPLEX 45 MIN: CPT

## 2023-05-04 RX ORDER — PROMETHAZINE HYDROCHLORIDE 25 MG/1
25 TABLET ORAL 3 TIMES DAILY PRN
Qty: 12 TABLET | Refills: 0 | Status: SHIPPED | OUTPATIENT
Start: 2023-05-04 | End: 2023-05-11

## 2023-05-04 RX ORDER — SENNA PLUS 8.6 MG/1
1 TABLET ORAL 2 TIMES DAILY
Qty: 60 TABLET | Refills: 11 | Status: SHIPPED | OUTPATIENT
Start: 2023-05-04 | End: 2024-05-03

## 2023-05-04 RX ORDER — CEPHALEXIN 500 MG/1
500 CAPSULE ORAL 2 TIMES DAILY
Qty: 14 CAPSULE | Refills: 0 | Status: SHIPPED | OUTPATIENT
Start: 2023-05-04 | End: 2023-05-11

## 2023-05-04 RX ORDER — HYDROMORPHONE HYDROCHLORIDE 2 MG/1
2 TABLET ORAL EVERY 6 HOURS PRN
Qty: 56 TABLET | Refills: 0 | Status: SHIPPED | OUTPATIENT
Start: 2023-05-04 | End: 2023-05-18

## 2023-05-04 RX ORDER — DEXAMETHASONE 4 MG/1
TABLET ORAL
Qty: 15 TABLET | Refills: 0 | Status: SHIPPED | OUTPATIENT
Start: 2023-05-04 | End: 2023-05-19

## 2023-05-04 RX ORDER — METRONIDAZOLE 500 MG/1
500 TABLET ORAL 2 TIMES DAILY
Qty: 14 TABLET | Refills: 0 | Status: SHIPPED | OUTPATIENT
Start: 2023-05-04 | End: 2023-05-11

## 2023-05-04 RX ADMIN — DEXAMETHASONE SODIUM PHOSPHATE 4 MG: 4 INJECTION, SOLUTION INTRAMUSCULAR; INTRAVENOUS at 03:25

## 2023-05-04 RX ADMIN — HYDROMORPHONE HYDROCHLORIDE 2 MG: 1 INJECTION, SOLUTION INTRAMUSCULAR; INTRAVENOUS; SUBCUTANEOUS at 12:35

## 2023-05-04 RX ADMIN — METRONIDAZOLE 500 MG: 500 INJECTION, SOLUTION INTRAVENOUS at 13:33

## 2023-05-04 RX ADMIN — HYDROMORPHONE HYDROCHLORIDE 2 MG: 1 INJECTION, SOLUTION INTRAMUSCULAR; INTRAVENOUS; SUBCUTANEOUS at 08:22

## 2023-05-04 RX ADMIN — HYDROMORPHONE HYDROCHLORIDE 2 MG: 1 INJECTION, SOLUTION INTRAMUSCULAR; INTRAVENOUS; SUBCUTANEOUS at 06:05

## 2023-05-04 RX ADMIN — ONDANSETRON 4 MG: 2 INJECTION INTRAMUSCULAR; INTRAVENOUS at 08:22

## 2023-05-04 RX ADMIN — HYDROMORPHONE HYDROCHLORIDE 2 MG: 1 INJECTION, SOLUTION INTRAMUSCULAR; INTRAVENOUS; SUBCUTANEOUS at 01:23

## 2023-05-04 RX ADMIN — SODIUM CHLORIDE, PRESERVATIVE FREE 40 MG: 5 INJECTION INTRAVENOUS at 08:23

## 2023-05-04 RX ADMIN — ENOXAPARIN SODIUM 40 MG: 40 INJECTION SUBCUTANEOUS at 08:22

## 2023-05-04 RX ADMIN — SODIUM CHLORIDE: 9 INJECTION, SOLUTION INTRAVENOUS at 06:00

## 2023-05-04 RX ADMIN — ESCITALOPRAM OXALATE 10 MG: 10 TABLET ORAL at 08:23

## 2023-05-04 RX ADMIN — CETIRIZINE HYDROCHLORIDE 10 MG: 10 TABLET ORAL at 08:23

## 2023-05-04 RX ADMIN — CLOPIDOGREL BISULFATE 75 MG: 75 TABLET, FILM COATED ORAL at 08:23

## 2023-05-04 RX ADMIN — SODIUM CHLORIDE, PRESERVATIVE FREE 10 ML: 5 INJECTION INTRAVENOUS at 09:02

## 2023-05-04 RX ADMIN — DEXAMETHASONE SODIUM PHOSPHATE 4 MG: 4 INJECTION, SOLUTION INTRAMUSCULAR; INTRAVENOUS at 14:19

## 2023-05-04 RX ADMIN — METRONIDAZOLE 500 MG: 500 INJECTION, SOLUTION INTRAVENOUS at 06:02

## 2023-05-04 RX ADMIN — Medication 81 MG: at 08:23

## 2023-05-04 ASSESSMENT — PAIN SCALES - GENERAL
PAINLEVEL_OUTOF10: 4
PAINLEVEL_OUTOF10: 8
PAINLEVEL_OUTOF10: 4

## 2023-05-04 ASSESSMENT — PAIN DESCRIPTION - ORIENTATION
ORIENTATION: LOWER
ORIENTATION: LOWER

## 2023-05-04 ASSESSMENT — PAIN DESCRIPTION - LOCATION
LOCATION: ABDOMEN
LOCATION: ABDOMEN

## 2023-05-04 ASSESSMENT — PAIN DESCRIPTION - DESCRIPTORS
DESCRIPTORS: ACHING
DESCRIPTORS: ACHING

## 2023-05-04 ASSESSMENT — PAIN - FUNCTIONAL ASSESSMENT
PAIN_FUNCTIONAL_ASSESSMENT: ACTIVITIES ARE NOT PREVENTED
PAIN_FUNCTIONAL_ASSESSMENT: ACTIVITIES ARE NOT PREVENTED

## 2023-05-04 NOTE — PLAN OF CARE
Problem: Discharge Planning  Goal: Discharge to home or other facility with appropriate resources  5/4/2023 1542 by Loy Langford RN  Outcome: Completed  5/4/2023 0905 by Loy Langford RN  Outcome: Progressing  5/4/2023 0520 by Livan Grimaldo RN  Outcome: Progressing     Problem: Pain  Goal: Verbalizes/displays adequate comfort level or baseline comfort level  5/4/2023 1542 by Loy Langford RN  Outcome: Completed  5/4/2023 0905 by Loy Langford RN  Outcome: Progressing  5/4/2023 0520 by Livan Grimaldo RN  Outcome: Progressing     Problem: Skin/Tissue Integrity  Goal: Absence of new skin breakdown  Description: 1. Monitor for areas of redness and/or skin breakdown  2. Assess vascular access sites hourly  3. Every 4-6 hours minimum:  Change oxygen saturation probe site  4. Every 4-6 hours:  If on nasal continuous positive airway pressure, respiratory therapy assess nares and determine need for appliance change or resting period.   5/4/2023 1542 by Loy Langford RN  Outcome: Completed  5/4/2023 0905 by Loy Langford RN  Outcome: Progressing  5/4/2023 0520 by Livan Grimaldo RN  Outcome: Progressing     Problem: Safety - Adult  Goal: Free from fall injury  5/4/2023 1542 by Loy Langford RN  Outcome: Completed  5/4/2023 0905 by Loy Langford RN  Outcome: Progressing  5/4/2023 0520 by Livan Grimaldo RN  Outcome: Progressing

## 2023-05-04 NOTE — DISCHARGE INSTR - COC
Continuity of Care Form    Patient Name: Casimiro Del Toro   :  1955  MRN:  [id number]    Admit date:  2023  Discharge date:  23    Code Status Order: Full Code   Advance Directives:     Admitting Physician:  Aleisha Rogers MD  PCP: Ashley Chang MD    Discharging Nurse: [address] Unit/Room#: 0675/0884-01  Discharging Unit Phone Number: [phone]    Emergency Contact:   Extended Emergency Contact Information  Primary Emergency Contact: Dana Goodwin  Home Phone: [phone]  Relation: Child  Secondary Emergency Contact: [address] Phone: [phone]  Relation: Child    Past Surgical History:  Past Surgical History:   Procedure Laterality Date    BREAST SURGERY      lumpectomy    FINGER TRIGGER RELEASE      HYSTERECTOMY (CERVIX STATUS UNKNOWN)      KNEE CARTILAGE SURGERY Bilateral     TOENAIL EXCISION Bilateral     all       Immunization History:   Immunization History   Administered Date(s) Administered    COVID-19, MODERNA BLUE border, Primary or Immunocompromised, (age 12y+), IM, 100 mcg/0.5mL 2022, 2022    Influenza Virus Vaccine 11/10/2011, 2013, 2017, 2020    Pneumococcal, PPSV23, PNEUMOVAX 23, (age 2y+), SC/IM, 0.5mL 11/10/2011       Active Problems:  Patient Active Problem List   Diagnosis Code    Type 2 diabetes mellitus without complications ([facility] O15.2    Personal history of nicotine dependence Z87.891    Mixed bipolar I disorder ([facility] F31.60    Essential hypertension I10    Gastroesophageal reflux disease K21.9    Long term (current) use of antithrombotics/antiplatelets P87.87    Prsnl hx of TIA (TIA), and cereb infrc w/o resid deficits Z86.73    Peripheral vascular disease, unspecified (HCC) I73.9    Chronic hepatitis C (HCC) B18.2    Abdominal pain R10.9    Colitis K52.9    Peritoneal carcinomatosis ([facility] C78.6    Acute cystitis without hematuria N30.00    Hypocalcemia E83.51    Hypokalemia E87.6    Hypomagnesemia E83.42    Nausea

## 2023-05-04 NOTE — CARE COORDINATION
Transitional planning-called [address] and talked with Khang-informed him of patient's discharge.  They will pull needed information from [facility]

## 2023-05-04 NOTE — PLAN OF CARE
Problem: Discharge Planning  Goal: Discharge to home or other facility with appropriate resources  5/4/2023 0905 by Lana Del Rosario RN  Outcome: Progressing  5/4/2023 0520 by Yaritza Humphries RN  Outcome: Progressing     Problem: Pain  Goal: Verbalizes/displays adequate comfort level or baseline comfort level  5/4/2023 0905 by Lana Del Rosario RN  Outcome: Progressing  5/4/2023 0520 by Yaritza Humphries RN  Outcome: Progressing     Problem: Skin/Tissue Integrity  Goal: Absence of new skin breakdown  Description: 1. Monitor for areas of redness and/or skin breakdown  2. Assess vascular access sites hourly  3. Every 4-6 hours minimum:  Change oxygen saturation probe site  4. Every 4-6 hours:  If on nasal continuous positive airway pressure, respiratory therapy assess nares and determine need for appliance change or resting period.   5/4/2023 0905 by Lana Del Rosario RN  Outcome: Progressing  5/4/2023 0520 by Yaritza Humphries RN  Outcome: Progressing     Problem: Safety - Adult  Goal: Free from fall injury  5/4/2023 0905 by Lana Del Rosario RN  Outcome: Progressing  5/4/2023 0520 by Yaritza Humphries RN  Outcome: Progressing

## 2023-05-05 NOTE — PROGRESS NOTES
[address] [facility] [address]  PROGRESS NOTE    Shift date: 05/1/23  Shift day: Monday   Shift # 2    Room # 4914/0840-35   Name: Quang Patterson                Worship:    Place of [employer]:     Referral: Routine Visit    Admit Date & Time: 5/1/2023  3:34 PM    Assessment:  Quang Patterson is a 74 y.o. female in the hospital       Intervention:  Writer introduced self and title as . Patient did not appear to mind  presence and engaged in conversation. Patient discussed events leading up to her hospital visit and a recent cancer diagnosis she received on Thursday. Patient stated family members know she is on campus.  provided a supportive presence through active listening and words of affirmation. Outcome:  Patient continues processing her health and a recent diagnosis. Plan:  Chaplains will remain available to offer spiritual and emotional support as needed.       Electronically signed by Carmina Rosenthal on 5/1/2023 at 11:24 PM.  [facility] [place], [place]  [phone]
Comprehensive Nutrition Assessment    Type and Reason for Visit:  Positive Nutrition Screen    Nutrition Recommendations/Plan:   Continue current diet  Recommend high protein/high calorie ONS TID with meals   Monitor weight, labs and intake. Malnutrition Assessment:  Malnutrition Status: At risk for malnutrition (Comment) (05/02/23 1995)    Context:  Acute Illness     Findings of the 6 clinical characteristics of malnutrition:  Energy Intake:  75% or less of estimated energy requirements for 7 or more days  Weight Loss:  No significant weight loss     Body Fat Loss:  No significant body fat loss     Muscle Mass Loss:  No significant muscle mass loss    Fluid Accumulation:  No significant fluid accumulation     Strength:  Not Performed    Nutrition Assessment:    Patient seen for poor appetite/intake. Admitted for colitis, abdominal pain, peritoneal carcinomatosis. PMH T2DM, bipolar disorder. Patient reports she continues to have a poor appetite r/t pain. She is agreeable to drinking ensure, order in place. Per EHR, no noted weight loss. Ca 6.3, K 3.3. Medication reviewed. Nutrition Related Findings:    Labs/meds reviewed. Wound Type: None       Current Nutrition Intake & Therapies:    Average Meal Intake: 1-25%, 26-50%  Average Supplements Intake: None Ordered  ADULT DIET; Regular  ADULT ORAL NUTRITION SUPPLEMENT; Breakfast, Lunch, Dinner; Standard High Calorie/High Protein Oral Supplement    Anthropometric Measures:  Height: 5' 7\" (170.2 cm)  Ideal Body Weight (IBW): 135 lbs (61 kg)       Current Body Weight: 166 lb 7.2 oz (75.5 kg), 123.3 % IBW. Weight Source: Not Specified  Current BMI (kg/m2): 26.1                          BMI Categories: Overweight (BMI 25.0-29. 9)    Estimated Daily Nutrient Needs:  Energy Requirements Based On: Kcal/kg  Weight Used for Energy Requirements: Current  Energy (kcal/day): 1900 kcal/day  Weight Used for Protein Requirements: Current  Protein (g/day): 90 gm
Gynecology Oncology Resident Interval Note    Patient updated regarding plan of care from oncologic standpoint (please see prior consultation note for further details from 5/1/23). Patient is not a surgical candidate for her newly diagnosed primary peritoneal cancer at this time and recommend prompt initiation of chemotherapy. Discussed plan of care with her [facility] office at [facility] and made follow up appointment for 5/9/2023 at 3:30pm for close interval follow up. Patient updated on this appointment. Patient is currently established with palliative care and receives narcotics, consider titrating as needed for patient comfort. Given her diagnosis and chronic narcotic use, she is high risk for bowel obstruction and would recommend aggressive bowel and GI regimen as well after completion of treatment for her acute colitis. Suspect her GI symptoms and pain are due to her active cancer and would try to expedite initiation of chemotherapy. Patient is to follow up with Dr. Don Cuellar in the office on 7/19/2023 at 2:30pm for follow up after chemotherapy treatment to revisit possible debulking procedure at that time. Dr. Don Cuellar updated on plan of care. Please contact OBGYN resident Sonya Gallagher for any other questions/concerns regarding her gynecologic care. Cedrick Jewell DO  OB/GYN Resident, [facility]  5/3/2023, 11:45 AM      Attending Physician Statement  I have discussed the care of Keely Preciado, including pertinent history and exam findings, with the resident. I agree with the assessment, plan and orders as documented by the resident.      Jennifer Johnson MD  Gynecologic Oncology
[facility]  Office: [address], DO, Luiz Pandya, DO, [facility], DO, [facility] Blood, DO, Juliocesar Rizo MD, Faiza Wyatt MD, Tigist Ulrich MD, Agnes Gomez MD,  Kathy Guallpa MD, Sonali Rivera MD, Carla Durand, DO, Mack Dupont MD,  Angelika Davila MD, Ever Anthony MD, Pelon Quiroz, DO, Kervin Ansari MD, Olinda Smith MD, Alissa Garg, DO, Esteban Gray MD, Lourena Goltz, MD, Betty Donahue MD, Michae Hammans, MD,  Atul Pierre, DO, Chikis Mitchell MD,  Daisy Matos, ASH[place],  Milton Somers, CNP, Luiz Ornelas, CNP, Mercedes Gonzalez, CNP,  Cee Mcghee, [place], Tatum Flores, CNP, Eduarda Barros, CNP, Bisi Rueda, CNP, Shree Peter, CNP, Denisa Miguel, CNP, ARNOLDO MendesC, Porsha Shaikh, CNS, Gerry Hammonds, CNP, Irene Villatoro, [address] [place] [address]    Progress Note    5/3/2023    11:13 AM    Name:   Dennys Moreno  MRN:     [id number]     Acct:      [de-identified]   Room:   [facility]6444-96   Day:  2  Admit Date:  5/1/2023  3:34 PM    PCP:   Deepak Cox MD  Code Status:  Full Code    Subjective:     C/C:   Chief Complaint   Patient presents with    Dizziness     Interval History Status: not changed. No issues overnight  C/o nausea this AM  Still having persistent abdominal pain  Follows with palliative outpatient  Plans for palliative evaluation     Brief History:     Dennys Moreno is a 74 y.o.  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 Patient does describe worsening constant pelvic pain. Denies radiation.   Does describe worsening acute diarrhea over the past 1 to 2 weeks with 2-3 loose bowel movements
[facility]  Office: [address], DO, Yumiko Peter, DO, Isaiasazul Carlton, DO, Enrique Amanda Blood, DO, Hermelinda Lawson MD, Matthew Alonzo MD, Damien Benson MD, Aliec Castellanos MD,  Federico Rai MD, Cee Rothman MD, Raymond Rothman DO, Izabela Brumfield MD,  Terri Sanchez MD, Zana Tinoco MD, Ramana Loyola, DO, Azael Lee MD, Brittany Mix MD, Stephen Ramirez DO, Rubén Chaparro MD, Artem Islas MD, Rufino Hazel MD, Sam Phillips MD,  Andrena Jeans, DO, Naomi Cross MD,  Santy Trejo, CNP,  Court Beebe, CNP, Patricia Pereira, CNP, Mary Perez, CNP,  Emerald Cabrera, DNP, Aleksandr Garcia, CNP, Kimmy Luevano, CNP, Jay Kent, CNP, Lennox Portela, CNP, Grazyna Ramos, CNP, Kevin Clark PA-C, Cata Farooq, CNS, Kylah Vanegas, CNP, Jolly Burgos, [address] [place] [address]    Progress Note    5/4/2023    8:14 AM    Name:   Moses Swift  MRN:     [id number]     Acct:      [de-identified]   Room:   [address] [place] [address] Day:  3  Admit Date:  5/1/2023  3:34 PM    PCP:   Hernandez Vela MD  Code Status:  Full Code    Subjective:     C/C:   Chief Complaint   Patient presents with    Dizziness     Interval History Status: not changed. Seen and examined at bedside. In good spirits--requesting discharge today. No overnight events. VSS. Has follow-up care established at [facility] for oncology treatments. Brief History:     Moses Swift is a 74 y.o.  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 Patient does describe worsening constant pelvic pain. Denies radiation.   Does describe worsening acute diarrhea over the past 1 to 2 weeks with
Nico Hughes was evaluated today and a DME order was entered for a shower transfer bench  because she requires this to successfully complete daily living tasks of bathing and personal cares. A standard shower bench is necessary due to patient's impaired ambulation and mobility restrictions and would be unable to resolve these daily living tasks. The patient is capable of using a shower bench safely in their home and can maneuver within their home with adequate access. There is a caregiver available to provide necessary assistance.   The need for this equipment was discussed with the patient and she understands, is in agreement, and has not expressed an unwillingness to use the transfer bench
Occupational Therapy  Facility/Department: [facility]  Occupational Therapy Initial Assessment    Name: Rita Lacey  : 1955  MRN: [id number]  Date of Service: 2023    Discharge Recommendations:  Patient would benefit from continued therapy after discharge  OT Equipment Recommendations  Equipment Needed: Yes  Mobility Devices: ADL Assistive Devices  ADL Assistive Devices: Shower Chair with back       Patient Diagnosis(es): The primary encounter diagnosis was Hypomagnesemia. Diagnoses of Hypokalemia, Hypocalcemia, Malnutrition, unspecified type ([facility] [address] Malignant neoplasm of ovary, unspecified laterality ([facility] [address] Colitis, Encounter for palliative care, and Peritoneal carcinomatosis ([facility] [address] were also pertinent to this visit. Past Medical History:  has a past medical history of Anxiety, Arthritis, COPD (chronic obstructive pulmonary disease) ([facility] [address] and Hx of blood clots. Past Surgical History:  has a past surgical history that includes Breast surgery; Finger trigger release; Knee cartilage surgery (Bilateral); Hysterectomy; and toenail excision (Bilateral). Chief Complaint   Patient presents with    Dizziness         Assessment   Performance deficits / Impairments: Decreased functional mobility ; Decreased ADL status; Decreased endurance;Decreased high-level IADLs;Decreased balance;Decreased cognition;Decreased safe awareness  Assessment: Pt limited by decreased endurance and decreased cognition throughout functional activities this visit. Pt is expected to require skilled OT services during their acute hospitalization stay to address the above noted deficits through skilled occupational therapy intervention for promotion of increased independence throughout ADLs, IADLs and functional mobility tasks.    Prognosis: Good  Decision Making: Medium Complexity  REQUIRES OT FOLLOW-UP: Yes  Activity Tolerance  Activity Tolerance: Patient Tolerated treatment well        Plan   Occupational Therapy Plan  Times
PALLIATIVE CARE PROGRESS NOTE     NAME:  Kandy Gunderson  MEDICAL RECORD NUMBER:  [id number]  AGE: 74 y.o. GENDER: female  : 1955  TODAY'S DATE:  2023  Room: [facility]0593-40    Reason For Consult:  Goals of care evaluation  Distress management  Symptom Management  Guidance and support  Facilitate communications  Assistance in coordinating care  Recommendations for the above    Plan      Palliative Interaction:  The palliative care service was able to meet with the patient this morning. She is eating breakfast this morning. She is still having some pain, but it is significantly improved from yesterday morning. She tolerated dinner last night as well. HCPOA paperwork was filled out yesterday evening naming her sister her primary decision maker and her children secondary/tertiary. She is wanting to go home today. She states that if she can \"just stay on what you are giving me now, I'll be fine at home. \"    Discharge planning underway. Discussed with internal medicine. IMPRESSION/ PLAN  Symptom management/pain control    We feel the patient's symptoms are being controlled. Their current regimen has been reviewed by myself and discussed with the staff. We recommend adjusting their medications as follows:    Goals of care evaluation  The patient goals of care are live longer, improve or maintain function/quality of life, and preserve independence/autonomy/control  Long discussion to ensure the patient's understanding of goals of care, and theconcept of palliative care. Code Status:  FULL CODE    Other Recommendations: Follow up with Phyllis Cheung in the o/p setting. History of Present Illness     HISTORY OF PRESENT ILLNESS:   The palliative care team was able to meet with the patient this morning. She is currently resting in bed and is feeling quite poorly with significant nausea this morning. After introductions, I explained my role in her care.  I explained that palliative care is a specialty
Physical Therapy        Physical Therapy Cancel Note      DATE: 5/3/2023    NAME: Jimmy Farooq  MRN: [id number]   : 1955      Patient not seen this date for Physical Therapy due to:    Patient Declined: pt refusing due to \"chaparrita been nauseous and vomiting all morning\"       Electronically signed by Gene Templeton PTA on 5/3/2023 at 10:55 AM
Physical Therapy  Facility/Department: [facility]  Physical Therapy Initial Assessment    Name: Nila Taylor  : 1955  MRN: [id number]  Date of Service: 2023  Chief Complaint   Patient presents with    Dizziness      Discharge Recommendations:  Patient would benefit from continued therapy after discharge   PT Equipment Recommendations  Equipment Needed: No      Patient Diagnosis(es): The primary encounter diagnosis was Hypomagnesemia. Diagnoses of Hypokalemia, Hypocalcemia, Malnutrition, unspecified type ([address] Malignant neoplasm of ovary, unspecified laterality ([address] and Colitis were also pertinent to this visit. Past Medical History:  has a past medical history of Anxiety, Arthritis, COPD (chronic obstructive pulmonary disease) ([facility] [address] and Hx of blood clots. Past Surgical History:  has a past surgical history that includes Breast surgery; Finger trigger release; Knee cartilage surgery (Bilateral); Hysterectomy; and toenail excision (Bilateral). Assessment   Body Structures, Functions, Activity Limitations Requiring Skilled Therapeutic Intervention: Decreased functional mobility ; Decreased endurance;Decreased balance;Decreased strength; Increased pain  Assessment: Pt with mobility deficits requiring CGA to ambulate 2 feet with a RW. Pt frequently complaining of increased abdominal pain throughout today's session, declines to attempt further ambulation this date secondary to pain. Pt demonstrates significant BLE strength, endurance, and balance deficits. Pt will require 24 hour assistance with mobility upon discharge. Pt would benefit from additional PT upon discharge to maximize functional independence. Therapy Prognosis: Fair  Decision Making: Medium Complexity  Requires PT Follow-Up: Yes  Activity Tolerance  Activity Tolerance: Treatment limited secondary to agitation;Patient limited by pain; Patient limited by endurance     Plan   Physcial Therapy Plan  General Plan:  (5-6x/week)  Current
Physical Therapy  Facility/Department: [facility]  Daily Treatment Note  NAME: Gonsalo Hernandez  : 1955  MRN: [id number]    Date of Service: 2023    Discharge Recommendations:  Patient would benefit from continued therapy after discharge      Patient Diagnosis(es): The primary encounter diagnosis was Hypomagnesemia. Diagnoses of Hypokalemia, Hypocalcemia, Malnutrition, unspecified type ([facility] [address] Malignant neoplasm of ovary, unspecified laterality ([facility] [address] Colitis, Encounter for palliative care, and Peritoneal carcinomatosis ([facility] [address] were also pertinent to this visit. Assessment   Assessment: pt amb 112' no AD while pushing O2 tank, pt assisted with IV pole. pt paco therapy well. Activity Tolerance: Patient tolerated treatment well     Plan    Physical Therapy Plan  Current Treatment Recommendations: Strengthening;Balance training;Functional mobility training;Transfer training;Home exercise program;Therapeutic activities; Safety education & training;Patient/Caregiver education & training;Equipment evaluation, education, & procurement;Gait training;Stair training; Endurance training     Restrictions  Restrictions/Precautions  Required Braces or Orthoses?: No  Position Activity Restriction  Other position/activity restrictions: up with assist .     Subjective    Subjective  Subjective: pt attempted to defer therapy d/t reports of recently amb in hallway. Pain: pt reports 8/10 pain however does not give location. Cognition  Arousal/Alertness: Appropriate responses to stimuli  Following Commands:  Follows all commands without difficulty  Safety Judgement: Decreased awareness of need for assistance;Decreased awareness of need for safety     Objective      Bed Mobility Training  Bed Mobility Training: No  Transfer Training  Transfer Training: Yes  Overall Level of Assistance: Supervision (assist with tubing from IV & O2)  Sit to Stand: Supervision  Stand to Sit: Supervision  Stand Pivot Transfers:
Physician Progress Note      PATIENT:               Darlene Thomas  CSN #:                  [id number]  :                       1955  ADMIT DATE:       2023 3:34 PM  [address] [place] DATE:        2023 4:04 PM  RESPONDING  PROVIDER #:        Lazaro Ferrer APRN - NP          QUERY TEXT:    Pt presented with acute on chronic abdominal pain and some diarrhea was found   to be hypotensive and orthostatic at Dr. office with c/o being lightheaded     and has colitis documented. CT Abdomen showed significant thickening of   transverse colon consistent with acute colitis. WBC 6.7. patient is being   Treated with iv flagyl. Please clarify one of the following: The medical record reflects the following:  Risk Factors: peritoneal carcinomatosis with adenocarcinoma of ovary , poor   appetite  Clinical Indicators: presented with acute on  chronic abdominal pain and some   diarrhea was found to be hypotensive and orthostatic at Dr. office with c/o   being lightheaded   and has colitis documented. CT Abdomen showed significant   thickening of transverse colon consistent with acute colitis. patient is being   Treated with iv flagyl  Treatment: iv flagyl, lab monitoring    Thank Teofilo Rehman Dr  Email [email or web address]  Cell [phone]  office hours M-F 6am to 2:30pm  Options provided:  -- Bacterial Colitis  -- Acute Ischemic Colitis  -- Chronic Ischemic Colitis  -- Colitis due to other etiology, Please document other etiology. -- Other - I will add my own diagnosis  -- Disagree - Not applicable / Not valid  -- Disagree - Clinically unable to determine / Unknown  -- Refer to Clinical Documentation Reviewer    PROVIDER RESPONSE TEXT:    This patient has chronic ischemic colitis. Query created by: Cristian Olivier on 2023 10:11 AM      QUERY TEXT:    Patient admitted with acute on chronic abdominal pain with known peritoneal   carcinomatosis.  acute colitis magnesium level of .9 k of 2.9 , noted to have
Gratitude;Receptive

## 2023-05-31 PROCEDURE — 99358 PROLONG SERVICE W/O CONTACT: CPT | Performed by: OBSTETRICS & GYNECOLOGY

## 2023-05-31 PROCEDURE — 99359 PROLONG SERV W/O CONTACT ADD: CPT | Performed by: OBSTETRICS & GYNECOLOGY

## 2023-07-18 ENCOUNTER — TELEPHONE (OUTPATIENT)
Dept: GYNECOLOGIC ONCOLOGY | Age: 68
End: 2023-07-18

## 2023-07-18 NOTE — TELEPHONE ENCOUNTER
Pt canceled 7/19/23 appt because she has chemo tx on Wed & Thursday's. Pt requested an appt on either a Monday or Tuesday, writer informed that the provider is usually in surgery those days. Please advise pt when she needs to follow up.

## 2023-07-19 NOTE — TELEPHONE ENCOUNTER
Reviewed with Dr. Brittany Gruber  Please have patient schedule appt week of 7/31-8/4 to discuss tx  Thanks  Yudith

## 2023-07-20 NOTE — TELEPHONE ENCOUNTER
LVM to try and schedule in person with pt, or possibly virtual appt if she cannot come in on a Wed/ TH.

## 2023-07-26 ENCOUNTER — TELEPHONE (OUTPATIENT)
Dept: GYNECOLOGIC ONCOLOGY | Age: 68
End: 2023-07-26

## 2024-02-05 ENCOUNTER — TELEPHONE (OUTPATIENT)
Dept: GYNECOLOGIC ONCOLOGY | Age: 69
End: 2024-02-05

## 2024-02-05 NOTE — TELEPHONE ENCOUNTER
Called Dr. Barrett's office and requested last visit progress notes.  Also inquired if any imaging or [employer] testing was completed, was informed that neither was completed.  Office fax number was provided.

## 2024-02-07 NOTE — TELEPHONE ENCOUNTER
Called Dr. Barrett's office for second time to request notes/labs/imaging for last 6 months.  Message taken by  and sent to MA.

## 2024-04-11 RX ORDER — LOSARTAN POTASSIUM 25 MG/1
25 TABLET ORAL DAILY
COMMUNITY
Start: 2024-04-07

## 2024-04-11 RX ORDER — INSULIN GLARGINE 100 [IU]/ML
10 INJECTION, SOLUTION SUBCUTANEOUS EVERY MORNING
COMMUNITY
Start: 2024-02-06

## 2024-04-11 RX ORDER — SITAGLIPTIN 100 MG/1
100 TABLET, FILM COATED ORAL DAILY
COMMUNITY
Start: 2023-10-13 | End: 2024-10-12

## 2024-04-11 RX ORDER — CLONAZEPAM 1 MG/1
1 TABLET ORAL 2 TIMES DAILY PRN
COMMUNITY
Start: 2024-03-28

## 2024-04-11 RX ORDER — OXYCODONE HYDROCHLORIDE 10 MG/1
10 TABLET ORAL EVERY 6 HOURS PRN
COMMUNITY
Start: 2023-07-24

## 2024-04-11 RX ORDER — DOCUSATE SODIUM 50MG AND SENNOSIDES 8.6MG 8.6; 5 MG/1; MG/1
1 TABLET, FILM COATED ORAL DAILY
COMMUNITY
Start: 2024-03-26

## 2024-04-11 RX ORDER — AMLODIPINE BESYLATE 10 MG/1
10 TABLET ORAL DAILY
COMMUNITY
Start: 2024-01-20

## 2024-04-11 RX ORDER — METHENAMINE HIPPURATE 1000 MG/1
1 TABLET ORAL 2 TIMES DAILY
COMMUNITY
Start: 2024-02-28 | End: 2025-02-27

## 2024-04-11 RX ORDER — VARENICLINE TARTRATE 0.5 (11)-1
KIT ORAL
COMMUNITY
Start: 2024-02-06 | End: 2024-05-06

## 2024-04-11 RX ORDER — LANOLIN ALCOHOL/MO/W.PET/CERES
400 CREAM (GRAM) TOPICAL DAILY
COMMUNITY
Start: 2024-03-25

## 2024-04-11 RX ORDER — CLONAZEPAM 0.5 MG/1
0.5 TABLET ORAL EVERY 12 HOURS PRN
COMMUNITY
Start: 2023-10-03

## 2024-04-11 RX ORDER — TIOTROPIUM BROMIDE INHALATION SPRAY 3.12 UG/1
2 SPRAY, METERED RESPIRATORY (INHALATION) EVERY MORNING
COMMUNITY
Start: 2024-02-06

## 2024-05-24 ENCOUNTER — TELEPHONE (OUTPATIENT)
Dept: GYNECOLOGIC ONCOLOGY | Age: 69
End: 2024-05-24

## 2024-05-24 NOTE — TELEPHONE ENCOUNTER
Called patient to adjust 6/20/24 appointment with surgeon.  Patient informed writer she is currently admitted to [facility] and wants to speak with surgeon as soon as possible as she was told she was, 'on borrowed time.'  Appointment rescheduled to 6/5/24, update sent to surgeon.

## 2024-06-03 ENCOUNTER — TELEPHONE (OUTPATIENT)
Dept: GYNECOLOGIC ONCOLOGY | Age: 69
End: 2024-06-03

## 2024-06-03 NOTE — TELEPHONE ENCOUNTER
Left message 6/5/24 appointment needs rescheduled. Informed to return call to schedule.Can schedule virtual visit on 6/4/24 at 1:00 pm.

## 2024-06-03 NOTE — TELEPHONE ENCOUNTER
Patient returned phone call.  Writer spoke with daughter and daughter approved for appt to be changed to 6/4/24 at 1300.  Supervisor made aware.

## 2024-06-04 ENCOUNTER — TELEMEDICINE (OUTPATIENT)
Dept: GYNECOLOGIC ONCOLOGY | Age: 69
End: 2024-06-04
Payer: MEDICAID

## 2024-06-04 DIAGNOSIS — C57.9 MALIGNANT NEOPLASM OF FEMALE GENITAL ORGAN (HCC): Primary | ICD-10-CM

## 2024-06-04 PROCEDURE — 99214 OFFICE O/P EST MOD 30 MIN: CPT | Performed by: OBSTETRICS & GYNECOLOGY

## 2024-06-04 PROCEDURE — 1123F ACP DISCUSS/DSCN MKR DOCD: CPT | Performed by: OBSTETRICS & GYNECOLOGY

## 2024-06-04 NOTE — PROGRESS NOTES
[facility] Gynecologic Oncology  [address], [facility], [address]  [place] [id number]    GYNECOLOGIC ONCOLOGY   FOLLOW UP NOTE    PATIENT NAME: Sarah Goodwin  MRN: [id number]  DATE: 06/04/2023    REASON FOR VISIT:     Malignant neoplasm, suspicious for gynecologic origin  Follow up visit     TELEHEALTH CONSENT:      Sarah Goodwin, was evaluated through a synchronous (real-time) audio-video encounter. The patient is aware that this is a billable service, which includes applicable co-pays. This Virtual Visit was conducted with patient's consent. Patient identification was verified, and a caregiver was present when appropriate.     The patient was [facility]   [address] [facility] [address]    Provider was located at Facility (Appt Dept):   [address] [place] [address]  [address]  [place] [phone]    Confirm you are appropriately licensed, registered, or certified to deliver care in the state where the patient is located as indicated above. If you are not or unsure, please re-schedule the visit: Yes, I confirm.      Total time spent for this encounter:  30 minutes  --Pillo Curtis MD on 06/04/24 at 1600  An electronic signature was used to authenticate this note.      On 06/04/24, Sarah Goodwin consented to a Telehealth visit with the [facility] Gynecologic Oncology.     Sarah Goodwin was contacted to initiate a video encounter. Prior to continuing visit, Sarah Goodwin was verified using two factors. All individuals in the room were identified and approved by the patient prior to the consult.     The patient was noted to be laying in bed.     Additionally, Sarah Goodwin is reminded that all clinical decision making is being based upon the conversation and limited physical exam. It is discussed that further evaluation may be needed due to the limited nature of video visits. Patient confirms understanding and consents to telemedicine encounter.      This video visit is conducted

## 2024-06-04 NOTE — PROGRESS NOTES
Review of Systems   Constitutional:  Negative for appetite change, chills, diaphoresis, fatigue, fever and unexpected weight change.   HENT:   Negative for hearing loss, lump/mass, mouth sores, nosebleeds, sore throat, tinnitus, trouble swallowing and voice change.    Eyes:  Negative for eye problems and icterus.   Respiratory:  Positive for shortness of breath. Negative for chest tightness, cough, hemoptysis and wheezing.         Dx. COPD   Cardiovascular:  Negative for chest pain, leg swelling and palpitations.   Gastrointestinal:  Positive for diarrhea. Negative for abdominal distention, abdominal pain, blood in stool, constipation, nausea, rectal pain and vomiting.   Endocrine: Negative for hot flashes.   Genitourinary:  Positive for pelvic pain (Patient states pelvic pain 7/10). Negative for bladder incontinence, difficulty urinating, dyspareunia, dysuria, frequency, hematuria, menstrual problem, nocturia, vaginal bleeding and vaginal discharge.    Musculoskeletal:  Positive for gait problem (Due to SOB). Negative for arthralgias, back pain, flank pain, myalgias, neck pain and neck stiffness.   Skin:  Negative for itching, rash and wound.   Neurological:  Positive for extremity weakness, gait problem (Due to SOB) and numbness (bilat feet). Negative for dizziness, headaches, light-headedness, seizures and speech difficulty.   Hematological:  Negative for adenopathy. Bruises/bleeds easily.   Psychiatric/Behavioral:  Negative for confusion, decreased concentration, depression, sleep disturbance and suicidal ideas. The patient is nervous/anxious.

## 2024-06-06 ENCOUNTER — TELEPHONE (OUTPATIENT)
Dept: GYNECOLOGIC ONCOLOGY | Age: 69
End: 2024-06-06

## 2024-06-06 NOTE — TELEPHONE ENCOUNTER
Writer left another voicemail requesting records from Dr. Barrett office. Spoke with  they took a message for clinical to return call. I was also transferred to Clinical voicemail.

## 2024-06-06 NOTE — TELEPHONE ENCOUNTER
Writer left voicemail on  [facility] hematology and Oncology phone requesting records to be sent over.

## 2025-01-02 ENCOUNTER — TELEPHONE (OUTPATIENT)
Dept: GYNECOLOGIC ONCOLOGY | Age: 70
End: 2025-01-02

## 2025-01-02 NOTE — TELEPHONE ENCOUNTER
Received voicemail from [employer], reference number: [id number] requesting medical records.  Phone number provided called and message left with reference number stating all medical records requests need to be faxed to medical records and fax number provided.

## 2025-01-16 ENCOUNTER — TELEPHONE (OUTPATIENT)
Dept: GYNECOLOGIC ONCOLOGY | Age: 70
End: 2025-01-16

## 2025-01-16 NOTE — TELEPHONE ENCOUNTER
Attempted to call patient and daughter without success.  Called and spoke with son regarding patient and appointment.  Son to contact patient's daughter to call office.  Writer voiced appreciation.

## 2025-01-16 NOTE — TELEPHONE ENCOUNTER
Patient did not show to appointment on 1/16/2025 with Dr. Curtis. Called patient multiple times and unable to leave voicemail due to voicemail box being full. Son Greg, states that Dana is the point of contact for her care, but is unable to call her- he can only message her using Facebook Messenger. Called Shawn, her sister, and Shawn stated that she has not talked to Sarah in several days, and Dana can only be contacted using Facebook.     Writer requested that Shawn reaches out to Sarah and have her call the office to reschedule. Will mail letter as well, and will send [employer] message.